# Patient Record
Sex: MALE | Race: WHITE | NOT HISPANIC OR LATINO | Employment: OTHER | URBAN - METROPOLITAN AREA
[De-identification: names, ages, dates, MRNs, and addresses within clinical notes are randomized per-mention and may not be internally consistent; named-entity substitution may affect disease eponyms.]

---

## 2019-07-22 RX ORDER — CLONAZEPAM 1 MG/1
1 TABLET ORAL 2 TIMES DAILY PRN
Qty: 60 TABLET | Refills: 0 | Status: SHIPPED | OUTPATIENT
Start: 2019-07-22 | End: 2019-08-24 | Stop reason: SDUPTHER

## 2019-08-01 ENCOUNTER — OFFICE VISIT (OUTPATIENT)
Dept: FAMILY MEDICINE CLINIC | Facility: CLINIC | Age: 49
End: 2019-08-01

## 2019-08-01 VITALS
BODY MASS INDEX: 47.74 KG/M2 | DIASTOLIC BLOOD PRESSURE: 70 MMHG | SYSTOLIC BLOOD PRESSURE: 130 MMHG | HEART RATE: 84 BPM | HEIGHT: 67 IN | OXYGEN SATURATION: 98 % | TEMPERATURE: 98.1 F | WEIGHT: 304.2 LBS

## 2019-08-01 DIAGNOSIS — R33.9 INCOMPLETE EMPTYING OF BLADDER: Primary | ICD-10-CM

## 2019-08-01 DIAGNOSIS — N13.9 URINARY (TRACT) OBSTRUCTION: ICD-10-CM

## 2019-08-01 DIAGNOSIS — R31.9 TRAUMATIC HEMATURIA: ICD-10-CM

## 2019-08-01 DIAGNOSIS — F32.A DEPRESSION, UNSPECIFIED DEPRESSION TYPE: ICD-10-CM

## 2019-08-01 DIAGNOSIS — F41.9 ANXIETY: ICD-10-CM

## 2019-08-01 DIAGNOSIS — E78.5 HYPERLIPIDEMIA, UNSPECIFIED HYPERLIPIDEMIA TYPE: ICD-10-CM

## 2019-08-01 PROBLEM — K21.9 GERD (GASTROESOPHAGEAL REFLUX DISEASE): Status: ACTIVE | Noted: 2019-08-01

## 2019-08-01 PROBLEM — M15.9 GENERALIZED OSTEOARTHRITIS OF MULTIPLE SITES: Status: ACTIVE | Noted: 2019-08-01

## 2019-08-01 PROBLEM — E66.01 OBESITY, MORBID, BMI 40.0-49.9 (HCC): Status: ACTIVE | Noted: 2019-08-01

## 2019-08-01 PROBLEM — J45.909 ASTHMA: Status: ACTIVE | Noted: 2019-08-01

## 2019-08-01 PROBLEM — G47.8 SLEEP PARALYSIS: Status: ACTIVE | Noted: 2019-08-01

## 2019-08-01 PROBLEM — M85.80 OSTEOPENIA: Status: ACTIVE | Noted: 2019-08-01

## 2019-08-01 PROBLEM — E29.1 TESTOSTERONE DEFICIENCY IN MALE: Status: ACTIVE | Noted: 2019-08-01

## 2019-08-01 PROBLEM — Z79.52 CURRENT CHRONIC USE OF SYSTEMIC STEROIDS: Status: ACTIVE | Noted: 2019-08-01

## 2019-08-01 PROBLEM — R00.2 PALPITATIONS: Status: ACTIVE | Noted: 2019-08-01

## 2019-08-01 PROBLEM — F41.0 PANIC ATTACKS: Status: ACTIVE | Noted: 2019-08-01

## 2019-08-01 PROBLEM — Z94.0 H/O KIDNEY TRANSPLANT: Status: ACTIVE | Noted: 2019-08-01

## 2019-08-01 PROBLEM — H81.10 BPV (BENIGN POSITIONAL VERTIGO): Status: ACTIVE | Noted: 2019-08-01

## 2019-08-01 PROBLEM — M72.2 PLANTAR FASCIITIS: Status: ACTIVE | Noted: 2019-08-01

## 2019-08-01 PROCEDURE — 99214 OFFICE O/P EST MOD 30 MIN: CPT | Performed by: INTERNAL MEDICINE

## 2019-08-01 RX ORDER — OXYMORPHONE HYDROCHLORIDE 10 MG/1
TABLET ORAL
Refills: 0 | COMMUNITY
Start: 2019-07-20 | End: 2021-02-25

## 2019-08-01 RX ORDER — GABAPENTIN 100 MG/1
100 CAPSULE ORAL 3 TIMES DAILY
Refills: 2 | COMMUNITY
Start: 2019-07-20

## 2019-08-01 RX ORDER — CIPROFLOXACIN 500 MG/1
TABLET, FILM COATED ORAL
COMMUNITY
Start: 2019-06-11 | End: 2019-12-12

## 2019-08-01 RX ORDER — PREDNISONE 10 MG/1
TABLET ORAL
COMMUNITY
Start: 2019-07-13

## 2019-08-01 RX ORDER — TESTOSTERONE CYPIONATE 200 MG/ML
INJECTION, SOLUTION INTRAMUSCULAR
COMMUNITY
Start: 2019-07-13 | End: 2021-05-20

## 2019-08-01 RX ORDER — FUROSEMIDE 20 MG/1
TABLET ORAL
COMMUNITY
Start: 2019-07-13

## 2019-08-01 RX ORDER — AZATHIOPRINE 50 MG/1
150 TABLET ORAL
COMMUNITY
Start: 2019-07-13

## 2019-08-01 RX ORDER — AMOXICILLIN AND CLAVULANATE POTASSIUM 875; 125 MG/1; MG/1
TABLET, FILM COATED ORAL
COMMUNITY
Start: 2019-06-11 | End: 2019-12-12

## 2019-08-01 RX ORDER — NEEDLES, DISPOSABLE 25GX5/8"
NEEDLE, DISPOSABLE MISCELLANEOUS
COMMUNITY
Start: 2019-06-17 | End: 2022-11-18

## 2019-08-01 RX ORDER — SIMVASTATIN 20 MG
20 TABLET ORAL NIGHTLY
Qty: 30 TABLET | Refills: 3
Start: 2019-08-01 | End: 2019-10-14 | Stop reason: SDUPTHER

## 2019-08-01 RX ORDER — SYRINGE WITH NEEDLE, 1 ML 25GX5/8"
SYRINGE, EMPTY DISPOSABLE MISCELLANEOUS
COMMUNITY
Start: 2019-07-13 | End: 2022-11-18

## 2019-08-01 NOTE — PROGRESS NOTES
Subjective   Misael Luna is a 49 y.o. male.     Chief Complaint   Patient presents with   • Hyperlipidemia     disabiltiy paper work       History of Present Illness   Here for disability forms and follow up for the urinary obstruction and incomplete voiding with history of renal transplant and depression.  Has continued recurrent urinary tract infections and Dr Bruno is treating the most recent with cipro and near complete with the course.  Still doing the self catheterization requiring laying in a prone position.  Has pain with catheterization that persists for 45 min after.  Still with blood in the urine and abdomen pain with the urinary retention.  Follow-up for cholesterol.  Currently has been feeling well without any myalgias, muscle aches, weakness, or other issues.  Currently is adherent with his medication regimen and denies medication side effects. Is not due for lab follow-up.    Has been seeing [facility] for counseling but has been rescheduled by the clinic multiple times.    The following portions of the patient's history were reviewed and updated as appropriate: allergies, current medications, past family history, past medical history, past social history, past surgical history and problem list.    History reviewed. No pertinent past medical history.    Past Surgical History:   Procedure Laterality Date   • BLADDER SURGERY      bladder augmentation   • NEPHRECTOMY     • TRANSPLANTATION RENAL         Family History   Problem Relation Age of Onset   • Arthritis Mother    • Asthma Mother    • Arthritis Father    • Asthma Father    • Heart disease Father    • COPD Father    • Hyperlipidemia Father    • Arthritis Maternal Grandmother    • Osteoporosis Maternal Grandfather    • Arthritis Maternal Grandfather        Social History     Socioeconomic History   • Marital status:      Spouse name: Not on file   • Number of children: Not on file   • Years of education: Not on file   • Highest  education level: Not on file   Tobacco Use   • Smoking status: Never Smoker   • Smokeless tobacco: Never Used   Substance and Sexual Activity   • Alcohol use: No     Frequency: Never   • Drug use: No       Review of Systems   Constitutional: Negative for unexpected weight gain and unexpected weight loss.   HENT: Negative for trouble swallowing and voice change.    Eyes: Negative for visual disturbance.   Respiratory: Negative for apnea, cough, chest tightness and shortness of breath.    Cardiovascular: Positive for leg swelling. Negative for chest pain and palpitations.   Gastrointestinal: Positive for abdominal pain. Negative for blood in stool, diarrhea, nausea, vomiting, GERD and indigestion.   Endocrine: Positive for polydipsia and polyuria.   Musculoskeletal: Negative for arthralgias and joint swelling.   Neurological: Negative for tremors, syncope, weakness, light-headedness, memory problem and confusion.   Hematological: Negative for adenopathy. Does not bruise/bleed easily.   Psychiatric/Behavioral: Negative for depressed mood and stress. The patient is not nervous/anxious.        Objective   Vitals:    08/01/19 0912   BP: 130/70   Pulse: 84   Temp: 98.1 °F (36.7 °C)   SpO2: 98%     Body mass index is 47.64 kg/m².  Physical Exam   Constitutional: He is oriented to person, place, and time. He appears well-developed and well-nourished.   HENT:   Head: Normocephalic and atraumatic.   Eyes: Conjunctivae and EOM are normal. Pupils are equal, round, and reactive to light.   Neck: Normal range of motion. Neck supple.   Cardiovascular: Normal rate, regular rhythm, normal heart sounds and intact distal pulses.   Pulmonary/Chest: Effort normal and breath sounds normal. He has no rales.   Abdominal: Soft. Bowel sounds are normal. There is tenderness.   Suprapubic tenderness in obese abdomen.   Musculoskeletal: Normal range of motion. He exhibits edema. He exhibits no deformity.   Neurological: He is alert and oriented  to person, place, and time.   Skin: Skin is warm. Capillary refill takes less than 2 seconds. No rash noted. No erythema.   Psychiatric: He has a normal mood and affect. His behavior is normal. Judgment and thought content normal.       No results found for this or any previous visit (from the past 2016 hour(s)).  Assessment/Plan   Misael was seen today for hyperlipidemia.    Diagnoses and all orders for this visit:    Incomplete emptying of bladder    Traumatic hematuria    Urinary (tract) obstruction    Hyperlipidemia, unspecified hyperlipidemia type    Depression, unspecified depression type    Anxiety    Other orders  -     simvastatin (ZOCOR) 20 MG tablet; Take 1 tablet by mouth Every Night.      See disability form completed.  Continue the currnet medications and controlled substance agreement reviewd and signed in office.  Follow up with urology and transplant team.

## 2019-08-27 RX ORDER — CLONAZEPAM 1 MG/1
TABLET ORAL
Qty: 60 TABLET | Refills: 0 | Status: SHIPPED | OUTPATIENT
Start: 2019-08-27 | End: 2019-10-09 | Stop reason: SDUPTHER

## 2019-10-09 RX ORDER — CLONAZEPAM 1 MG/1
1 TABLET ORAL 2 TIMES DAILY PRN
Qty: 60 TABLET | Refills: 1 | Status: SHIPPED | OUTPATIENT
Start: 2019-10-09 | End: 2019-12-12 | Stop reason: SDUPTHER

## 2019-10-14 ENCOUNTER — OFFICE VISIT (OUTPATIENT)
Dept: FAMILY MEDICINE CLINIC | Facility: CLINIC | Age: 49
End: 2019-10-14

## 2019-10-14 VITALS
HEIGHT: 67 IN | WEIGHT: 311.4 LBS | DIASTOLIC BLOOD PRESSURE: 84 MMHG | SYSTOLIC BLOOD PRESSURE: 130 MMHG | TEMPERATURE: 98.2 F | OXYGEN SATURATION: 97 % | HEART RATE: 66 BPM | BODY MASS INDEX: 48.88 KG/M2

## 2019-10-14 DIAGNOSIS — E78.5 HYPERLIPIDEMIA, UNSPECIFIED HYPERLIPIDEMIA TYPE: ICD-10-CM

## 2019-10-14 DIAGNOSIS — J45.40 MODERATE PERSISTENT ASTHMA, UNSPECIFIED WHETHER COMPLICATED: Primary | ICD-10-CM

## 2019-10-14 PROCEDURE — 99214 OFFICE O/P EST MOD 30 MIN: CPT | Performed by: INTERNAL MEDICINE

## 2019-10-14 PROCEDURE — 90686 IIV4 VACC NO PRSV 0.5 ML IM: CPT | Performed by: INTERNAL MEDICINE

## 2019-10-14 PROCEDURE — 90471 IMMUNIZATION ADMIN: CPT | Performed by: INTERNAL MEDICINE

## 2019-10-14 RX ORDER — ALBUTEROL SULFATE 90 UG/1
2 AEROSOL, METERED RESPIRATORY (INHALATION) EVERY 4 HOURS PRN
Qty: 1 INHALER | Refills: 5 | Status: SHIPPED | OUTPATIENT
Start: 2019-10-14 | End: 2021-02-25 | Stop reason: SDUPTHER

## 2019-10-14 RX ORDER — SIMVASTATIN 20 MG
20 TABLET ORAL NIGHTLY
Qty: 90 TABLET | Refills: 3 | Status: SHIPPED | OUTPATIENT
Start: 2019-10-14 | End: 2020-06-11 | Stop reason: SDUPTHER

## 2019-10-14 NOTE — PROGRESS NOTES
Subjective   Misael Luna is a 49 y.o. male.     Chief Complaint   Patient presents with   • Hyperlipidemia     labs   • Asthma       History of Present Illness   Here for follow up for the asthma.  States has been having an asthma issue nearly every day with cough and wheezing.  Using the nebulizer and albuterol inhaler and improves.  Using the proair but liked the ventolin better.  Using the albuterol mini nebulizer sometimes.  Follow-up for cholesterol.  Currently has been feeling well without any myalgias, muscle aches, weakness, numbness, chest pain, short of breath or other issues.  Currently is not adherent with medication regimen and denies medication side effects.  Has not had the simvastatin for 3 months.  Is due for lab follow-up.    The following portions of the patient's history were reviewed and updated as appropriate: allergies, current medications, past family history, past medical history, past social history, past surgical history and problem list.    Past Medical History:   Diagnosis Date   • Asthma    • Disorder of skin    • Generalized osteoarthritis    • Hematuria    • High risk sexual behavior    • History of closed fracture     Lower end of forearm, unspecified laterality    • History of kidney transplant     Kidney replaced by transplant   • Hypertension    • Incomplete emptying of bladder    • Obesity    • Penile lesion    • Tinea cruris    • Urinary obstruction    • Urinary tract infection        Past Surgical History:   Procedure Laterality Date   • BLADDER SURGERY      bladder augmentation   • NEPHRECTOMY     • TRANSPLANTATION RENAL  1991       Family History   Problem Relation Age of Onset   • Arthritis Mother    • Asthma Mother    • Arthritis Father    • Asthma Father    • Heart disease Father    • COPD Father    • Hyperlipidemia Father    • Arthritis Maternal Grandmother    • Osteoporosis Maternal Grandfather    • Arthritis Maternal Grandfather    • No Known Problems Other         Social History     Socioeconomic History   • Marital status:      Spouse name: Not on file   • Number of children: Not on file   • Years of education: Not on file   • Highest education level: Not on file   Tobacco Use   • Smoking status: Never Smoker   • Smokeless tobacco: Never Used   Substance and Sexual Activity   • Alcohol use: No     Frequency: Never   • Drug use: No       Review of Systems   Constitutional: Negative for activity change, appetite change, fatigue, fever, unexpected weight gain and unexpected weight loss.   HENT: Negative for nosebleeds, rhinorrhea, trouble swallowing and voice change.    Eyes: Negative for visual disturbance.   Respiratory: Negative for cough, chest tightness, shortness of breath and wheezing.    Cardiovascular: Positive for leg swelling. Negative for chest pain and palpitations.   Gastrointestinal: Positive for abdominal pain. Negative for blood in stool, constipation, diarrhea, nausea, vomiting, GERD and indigestion.   Endocrine: Positive for polydipsia and polyuria.   Genitourinary: Negative for dysuria, frequency and hematuria.   Musculoskeletal: Negative for arthralgias, back pain and myalgias.   Skin: Negative for rash and bruise.   Neurological: Negative for dizziness, tremors, weakness, light-headedness, numbness, headache and memory problem.   Hematological: Negative for adenopathy. Does not bruise/bleed easily.   Psychiatric/Behavioral: Negative for sleep disturbance and depressed mood. The patient is not nervous/anxious.        Objective   Vitals:    10/14/19 0945   BP: 130/84   Pulse: 66   Temp: 98.2 °F (36.8 °C)   SpO2: 97%     Body mass index is 48.77 kg/m².  Physical Exam   Constitutional: He is oriented to person, place, and time. He appears well-developed and well-nourished. No distress.   HENT:   Head: Normocephalic and atraumatic.   Right Ear: External ear normal.   Left Ear: External ear normal.   Nose: Nose normal.   Mouth/Throat: Oropharynx is  clear and moist.   Eyes: Conjunctivae and EOM are normal. Pupils are equal, round, and reactive to light.   Neck: Normal range of motion. Neck supple. No tracheal deviation present. No thyromegaly present.   Cardiovascular: Normal rate, regular rhythm, normal heart sounds and intact distal pulses. Exam reveals no gallop and no friction rub.   No murmur heard.  Pulmonary/Chest: Effort normal and breath sounds normal. No respiratory distress.   Abdominal: Soft. Bowel sounds are normal. He exhibits no mass. There is tenderness. There is no guarding.   Suprapubic tenderness in obese abdomen.   Musculoskeletal: Normal range of motion. He exhibits edema.   Trace bilateral lower extremity edema   Lymphadenopathy:     He has no cervical adenopathy.   Neurological: He is alert and oriented to person, place, and time. He displays normal reflexes. He exhibits normal muscle tone.   Skin: Skin is warm and dry. Capillary refill takes less than 2 seconds. No rash noted. He is not diaphoretic.   Psychiatric: He has a normal mood and affect. His behavior is normal. Judgment and thought content normal.   Nursing note and vitals reviewed.      No results found for this or any previous visit (from the past 2016 hour(s)).  Assessment/Plan   Misael was seen today for hyperlipidemia and asthma.    Diagnoses and all orders for this visit:    Moderate persistent asthma, unspecified whether complicated  -     albuterol sulfate  (90 Base) MCG/ACT inhaler; Inhale 2 puffs Every 4 (Four) Hours As Needed for Wheezing.  -     Fluticasone Furoate 100 MCG/ACT aerosol powder ; Inhale 1 puff Daily. Rinse mouth with water after use.    Hyperlipidemia, unspecified hyperlipidemia type    Other orders  -     Fluarix/Fluzone/Afluria Quad/FluLaval Quad  -     simvastatin (ZOCOR) 20 MG tablet; Take 1 tablet by mouth Every Night.        Restart the simvastatin that he has not been taking for 3 months.  Continue the other medications but will add inhaled  steroids for the uncontrolled asthma.  Continue follow up with transplant specialists for the kidney transplant.  No labs today but to follow up in 3 months and will check then.

## 2019-12-12 ENCOUNTER — OFFICE VISIT (OUTPATIENT)
Dept: FAMILY MEDICINE CLINIC | Facility: CLINIC | Age: 49
End: 2019-12-12

## 2019-12-12 VITALS
SYSTOLIC BLOOD PRESSURE: 140 MMHG | DIASTOLIC BLOOD PRESSURE: 82 MMHG | HEIGHT: 67 IN | HEART RATE: 72 BPM | BODY MASS INDEX: 48.76 KG/M2 | OXYGEN SATURATION: 97 %

## 2019-12-12 DIAGNOSIS — F41.9 ANXIETY: ICD-10-CM

## 2019-12-12 DIAGNOSIS — N30.01 ACUTE CYSTITIS WITH HEMATURIA: Primary | ICD-10-CM

## 2019-12-12 DIAGNOSIS — J45.40 MODERATE PERSISTENT ASTHMA, UNSPECIFIED WHETHER COMPLICATED: ICD-10-CM

## 2019-12-12 DIAGNOSIS — M25.571 ACUTE RIGHT ANKLE PAIN: ICD-10-CM

## 2019-12-12 DIAGNOSIS — E66.01 CLASS 3 SEVERE OBESITY WITHOUT SERIOUS COMORBIDITY WITH BODY MASS INDEX (BMI) OF 45.0 TO 49.9 IN ADULT, UNSPECIFIED OBESITY TYPE (HCC): ICD-10-CM

## 2019-12-12 PROBLEM — N39.0 URINARY TRACT INFECTION: Status: ACTIVE | Noted: 2019-12-12

## 2019-12-12 LAB
BILIRUB BLD-MCNC: NEGATIVE MG/DL
CLARITY, POC: CLEAR
COLOR UR: ABNORMAL
GLUCOSE UR STRIP-MCNC: NEGATIVE MG/DL
KETONES UR QL: NEGATIVE
LEUKOCYTE EST, POC: NEGATIVE
NITRITE UR-MCNC: NEGATIVE MG/ML
PH UR: 6.5 [PH] (ref 5–8)
PROT UR STRIP-MCNC: ABNORMAL MG/DL
RBC # UR STRIP: ABNORMAL /UL
SP GR UR: 1.01 (ref 1–1.03)
UROBILINOGEN UR QL: NORMAL

## 2019-12-12 PROCEDURE — 81003 URINALYSIS AUTO W/O SCOPE: CPT | Performed by: INTERNAL MEDICINE

## 2019-12-12 PROCEDURE — 99214 OFFICE O/P EST MOD 30 MIN: CPT | Performed by: INTERNAL MEDICINE

## 2019-12-12 RX ORDER — AMOXICILLIN AND CLAVULANATE POTASSIUM 875; 125 MG/1; MG/1
1 TABLET, FILM COATED ORAL 2 TIMES DAILY
Qty: 20 TABLET | Refills: 0 | Status: SHIPPED | OUTPATIENT
Start: 2019-12-12 | End: 2021-02-25

## 2019-12-12 RX ORDER — ALBUTEROL SULFATE 1.25 MG/3ML
1 SOLUTION RESPIRATORY (INHALATION) EVERY 6 HOURS PRN
Qty: 120 VIAL | Refills: 6 | Status: SHIPPED | OUTPATIENT
Start: 2019-12-12 | End: 2020-12-07 | Stop reason: SDUPTHER

## 2019-12-12 RX ORDER — NEEDLES, DISPOSABLE 18GX1 1/2"
NEEDLE, DISPOSABLE MISCELLANEOUS
Refills: 3 | COMMUNITY
Start: 2019-11-04 | End: 2022-11-18

## 2019-12-12 RX ORDER — CLONAZEPAM 1 MG/1
1 TABLET ORAL 2 TIMES DAILY PRN
Qty: 60 TABLET | Refills: 1 | Status: SHIPPED | OUTPATIENT
Start: 2019-12-12 | End: 2020-02-20 | Stop reason: SDUPTHER

## 2019-12-12 NOTE — PATIENT INSTRUCTIONS
Exercising to Lose Weight  Exercise is structured, repetitive physical activity to improve fitness and health. Getting regular exercise is important for everyone. It is especially important if you are overweight. Being overweight increases your risk of heart disease, stroke, diabetes, high blood pressure, and several types of cancer. Reducing your calorie intake and exercising can help you lose weight.  Exercise is usually categorized as moderate or vigorous intensity. To lose weight, most people need to do a certain amount of moderate-intensity or vigorous-intensity exercise each week.  Moderate-intensity exercise    Moderate-intensity exercise is any activity that gets you moving enough to burn at least three times more energy (calories) than if you were sitting.  Examples of moderate exercise include:  · Walking a mile in 15 minutes.  · Doing light yard work.  · Biking at an easy pace.  Most people should get at least 150 minutes (2 hours and 30 minutes) a week of moderate-intensity exercise to maintain their body weight.  Vigorous-intensity exercise  Vigorous-intensity exercise is any activity that gets you moving enough to burn at least six times more calories than if you were sitting. When you exercise at this intensity, you should be working hard enough that you are not able to carry on a conversation.  Examples of vigorous exercise include:  · Running.  · Playing a team sport, such as football, basketball, and soccer.  · Jumping rope.  Most people should get at least 75 minutes (1 hour and 15 minutes) a week of vigorous-intensity exercise to maintain their body weight.  How can exercise affect me?  When you exercise enough to burn more calories than you eat, you lose weight. Exercise also reduces body fat and builds muscle. The more muscle you have, the more calories you burn. Exercise also:  · Improves mood.  · Reduces stress and tension.  · Improves your overall fitness, flexibility, and  endurance.  · Increases bone strength.  The amount of exercise you need to lose weight depends on:  · Your age.  · The type of exercise.  · Any health conditions you have.  · Your overall physical ability.  Talk to your health care provider about how much exercise you need and what types of activities are safe for you.  What actions can I take to lose weight?  Nutrition    · Make changes to your diet as told by your health care provider or diet and nutrition specialist (dietitian). This may include:  ? Eating fewer calories.  ? Eating more protein.  ? Eating less unhealthy fats.  ? Eating a diet that includes fresh fruits and vegetables, whole grains, low-fat dairy products, and lean protein.  ? Avoiding foods with added fat, salt, and sugar.  · Drink plenty of water while you exercise to prevent dehydration or heat stroke.  Activity  · Choose an activity that you enjoy and set realistic goals. Your health care provider can help you make an exercise plan that works for you.  · Exercise at a moderate or vigorous intensity most days of the week.  ? The intensity of exercise may vary from person to person. You can tell how intense a workout is for you by paying attention to your breathing and heartbeat. Most people will notice their breathing and heartbeat get faster with more intense exercise.  · Do resistance training twice each week, such as:  ? Push-ups.  ? Sit-ups.  ? Lifting weights.  ? Using resistance bands.  · Getting short amounts of exercise can be just as helpful as long structured periods of exercise. If you have trouble finding time to exercise, try to include exercise in your daily routine.  ? Get up, stretch, and walk around every 30 minutes throughout the day.  ? Go for a walk during your lunch break.  ? Park your car farther away from your destination.  ? If you take public transportation, get off one stop early and walk the rest of the way.  ? Make phone calls while standing up and walking  around.  ? Take the stairs instead of elevators or escalators.  · Wear comfortable clothes and shoes with good support.  · Do not exercise so much that you hurt yourself, feel dizzy, or get very short of breath.  Where to find more information  · [place] Department of Health and Human Services: www.hhs.gov  · Centers for Disease Control and Prevention (CDC): www.cdc.gov  Contact a health care provider:  · Before starting a new exercise program.  · If you have questions or concerns about your weight.  · If you have a medical problem that keeps you from exercising.  Get help right away if you have any of the following while exercising:  · Injury.  · Dizziness.  · Difficulty breathing or shortness of breath that does not go away when you stop exercising.  · Chest pain.  · Rapid heartbeat.  Summary  · Being overweight increases your risk of heart disease, stroke, diabetes, high blood pressure, and several types of cancer.  · Losing weight happens when you burn more calories than you eat.  · Reducing the amount of calories you eat in addition to getting regular moderate or vigorous exercise each week helps you lose weight.  This information is not intended to replace advice given to you by your health care provider. Make sure you discuss any questions you have with your health care provider.  Document Released: 01/20/2012 Document Revised: 12/31/2018 Document Reviewed: 12/31/2018  [employer] Patient Education © 2019 [employer]      Calorie Counting for Weight Loss  Calories are units of energy. Your body needs a certain amount of calories from food to keep you going throughout the day. When you eat more calories than your body needs, your body stores the extra calories as fat. When you eat fewer calories than your body needs, your body burns fat to get the energy it needs.  Calorie counting means keeping track of how many calories you eat and drink each day. Calorie counting can be helpful if you need to lose  weight. If you make sure to eat fewer calories than your body needs, you should lose weight. Ask your health care provider what a healthy weight is for you.  For calorie counting to work, you will need to eat the right number of calories in a day in order to lose a healthy amount of weight per week. A dietitian can help you determine how many calories you need in a day and will give you suggestions on how to reach your calorie goal.  · A healthy amount of weight to lose per week is usually 1-2 lb (0.5-0.9 kg). This usually means that your daily calorie intake should be reduced by 500-750 calories.  · Eating 1,200 - 1,500 calories per day can help most women lose weight.  · Eating 1,500 - 1,800 calories per day can help most men lose weight.  What is my plan?  My goal is to have __________ calories per day.  If I have this many calories per day, I should lose around __________ pounds per week.  What do I need to know about calorie counting?  In order to meet your daily calorie goal, you will need to:  · Find out how many calories are in each food you would like to eat. Try to do this before you eat.  · Decide how much of the food you plan to eat.  · Write down what you ate and how many calories it had. Doing this is called keeping a food log.  To successfully lose weight, it is important to balance calorie counting with a healthy lifestyle that includes regular activity. Aim for 150 minutes of moderate exercise (such as walking) or 75 minutes of vigorous exercise (such as running) each week.  Where do I find calorie information?    The number of calories in a food can be found on a Nutrition Facts label. If a food does not have a Nutrition Facts label, try to look up the calories online or ask your dietitian for help.  Remember that calories are listed per serving. If you choose to have more than one serving of a food, you will have to multiply the calories per serving by the amount of servings you plan to eat. For  "example, the label on a package of bread might say that a serving size is 1 slice and that there are 90 calories in a serving. If you eat 1 slice, you will have eaten 90 calories. If you eat 2 slices, you will have eaten 180 calories.  How do I keep a food log?  Immediately after each meal, record the following information in your food log:  · What you ate. Don't forget to include toppings, sauces, and other extras on the food.  · How much you ate. This can be measured in cups, ounces, or number of items.  · How many calories each food and drink had.  · The total number of calories in the meal.  Keep your food log near you, such as in a small notebook in your pocket, or use a mobile dariel or website. Some programs will calculate calories for you and show you how many calories you have left for the day to meet your goal.  What are some calorie counting tips?    · Use your calories on foods and drinks that will fill you up and not leave you hungry:  ? Some examples of foods that fill you up are nuts and nut butters, vegetables, lean proteins, and high-fiber foods like whole grains. High-fiber foods are foods with more than 5 g fiber per serving.  ? Drinks such as sodas, specialty coffee drinks, alcohol, and juices have a lot of calories, yet do not fill you up.  · Eat nutritious foods and avoid empty calories. Empty calories are calories you get from foods or beverages that do not have many vitamins or protein, such as candy, sweets, and soda. It is better to have a nutritious high-calorie food (such as an avocado) than a food with few nutrients (such as a bag of chips).  · Know how many calories are in the foods you eat most often. This will help you calculate calorie counts faster.  · Pay attention to calories in drinks. Low-calorie drinks include water and unsweetened drinks.  · Pay attention to nutrition labels for \"low fat\" or \"fat free\" foods. These foods sometimes have the same amount of calories or more calories " than the full fat versions. They also often have added sugar, starch, or salt, to make up for flavor that was removed with the fat.  · Find a way of tracking calories that works for you. Get creative. Try different apps or programs if writing down calories does not work for you.  What are some portion control tips?  · Know how many calories are in a serving. This will help you know how many servings of a certain food you can have.  · Use a measuring cup to measure serving sizes. You could also try weighing out portions on a kitchen scale. With time, you will be able to estimate serving sizes for some foods.  · Take some time to put servings of different foods on your favorite plates, bowls, and cups so you know what a serving looks like.  · Try not to eat straight from a bag or box. Doing this can lead to overeating. Put the amount you would like to eat in a cup or on a plate to make sure you are eating the right portion.  · Use smaller plates, glasses, and bowls to prevent overeating.  · Try not to multitask (for example, watch TV or use your computer) while eating. If it is time to eat, sit down at a table and enjoy your food. This will help you to know when you are full. It will also help you to be aware of what you are eating and how much you are eating.  What are tips for following this plan?  Reading food labels  · Check the calorie count compared to the serving size. The serving size may be smaller than what you are used to eating.  · Check the source of the calories. Make sure the food you are eating is high in vitamins and protein and low in saturated and trans fats.  Shopping  · Read nutrition labels while you shop. This will help you make healthy decisions before you decide to purchase your food.  · Make a grocery list and stick to it.  Cooking  · Try to cook your favorite foods in a healthier way. For example, try baking instead of frying.  · Use low-fat dairy products.  Meal planning  · Use more fruits  "and vegetables. Half of your plate should be fruits and vegetables.  · Include lean proteins like poultry and fish.  How do I count calories when eating out?  · Ask for smaller portion sizes.  · Consider sharing an entree and sides instead of getting your own entree.  · If you get your own entree, eat only half. Ask for a box at the beginning of your meal and put the rest of your entree in it so you are not tempted to eat it.  · If calories are listed on the menu, choose the lower calorie options.  · Choose dishes that include vegetables, fruits, whole grains, low-fat dairy products, and lean protein.  · Choose items that are boiled, broiled, grilled, or steamed. Stay away from items that are buttered, battered, fried, or served with cream sauce. Items labeled \"crispy\" are usually fried, unless stated otherwise.  · Choose water, low-fat milk, unsweetened iced tea, or other drinks without added sugar. If you want an alcoholic beverage, choose a lower calorie option such as a glass of wine or light beer.  · Ask for dressings, sauces, and syrups on the side. These are usually high in calories, so you should limit the amount you eat.  · If you want a salad, choose a garden salad and ask for grilled meats. Avoid extra toppings like lainez, cheese, or fried items. Ask for the dressing on the side, or ask for olive oil and vinegar or lemon to use as dressing.  · Estimate how many servings of a food you are given. For example, a serving of cooked rice is ½ cup or about the size of half a baseball. Knowing serving sizes will help you be aware of how much food you are eating at restaurants. The list below tells you how big or small some common portion sizes are based on everyday objects:  ? 1 oz--4 stacked dice.  ? 3 oz--1 deck of cards.  ? 1 tsp--1 die.  ? 1 Tbsp--½ a ping-pong ball.  ? 2 Tbsp--1 ping-pong ball.  ? ½ cup--½ baseball.  ? 1 cup--1 baseball.  Summary  · Calorie counting means keeping track of how many calories " you eat and drink each day. If you eat fewer calories than your body needs, you should lose weight.  · A healthy amount of weight to lose per week is usually 1-2 lb (0.5-0.9 kg). This usually means reducing your daily calorie intake by 500-750 calories.  · The number of calories in a food can be found on a Nutrition Facts label. If a food does not have a Nutrition Facts label, try to look up the calories online or ask your dietitian for help.  · Use your calories on foods and drinks that will fill you up, and not on foods and drinks that will leave you hungry.  · Use smaller plates, glasses, and bowls to prevent overeating.  This information is not intended to replace advice given to you by your health care provider. Make sure you discuss any questions you have with your health care provider.  Document Released: 12/18/2006 Document Revised: 09/06/2019 Document Reviewed: 11/17/2017  [employer] Patient Education © 2019 [employer]

## 2019-12-12 NOTE — PROGRESS NOTES
Subjective   Misael Luna is a 49 y.o. male.     Chief Complaint   Patient presents with   • Urinary Tract Infection   • Edema     R. ankle       History of Present Illness   Complains of 3-4 days of back pain, fever, chills, nausea and mild abdomen pain with increased blood in the urine with the self catheterization.  Does not see Dr Bruno urology for over a month.  Last took cipro 15 days ago for the recurrent UTIs as is high risk with the self catheterization and urinary retention.  For the last 3-4 days with swelling and pain in the right ankle that is a recurrent pain ever since injury in 1996.  In past has seen Dr Murillo in the past but currently is not doing anything for it.  No recent injury or fall.    The following portions of the patient's history were reviewed and updated as appropriate: allergies, current medications, past family history, past medical history, past social history, past surgical history and problem list.    Past Medical History:   Diagnosis Date   • Asthma    • Disorder of skin    • Generalized osteoarthritis    • Hematuria    • High risk sexual behavior    • History of closed fracture     Lower end of forearm, unspecified laterality    • History of kidney transplant     Kidney replaced by transplant   • Hypertension    • Incomplete emptying of bladder    • Obesity    • Penile lesion    • Tinea cruris    • Urinary obstruction    • Urinary tract infection        Past Surgical History:   Procedure Laterality Date   • BLADDER SURGERY      bladder augmentation   • NEPHRECTOMY     • TRANSPLANTATION RENAL  1991       Family History   Problem Relation Age of Onset   • Arthritis Mother    • Asthma Mother    • Arthritis Father    • Asthma Father    • Heart disease Father    • COPD Father    • Hyperlipidemia Father    • Arthritis Maternal Grandmother    • Osteoporosis Maternal Grandfather    • Arthritis Maternal Grandfather    • No Known Problems Other        Social History     Socioeconomic  "History   • Marital status:      Spouse name: Not on file   • Number of children: Not on file   • Years of education: Not on file   • Highest education level: Not on file   Tobacco Use   • Smoking status: Never Smoker   • Smokeless tobacco: Never Used   Substance and Sexual Activity   • Alcohol use: No     Frequency: Never   • Drug use: No       Current Outpatient Medications   Medication Sig Dispense Refill   • albuterol sulfate  (90 Base) MCG/ACT inhaler Inhale 2 puffs Every 4 (Four) Hours As Needed for Wheezing. 1 inhaler 5   • azaTHIOprine (IMURAN) 50 MG tablet 150 mg.     • B-D 3CC LUER-DOMINGUEZ SYR 22GX1\" 22G X 1\" 3 ML misc      • BD HYPODERMIC NEEDLE 18G X 1\" misc      • clonazePAM (KlonoPIN) 1 MG tablet Take 1 tablet by mouth 2 (Two) Times a Day As Needed for Anxiety. 60 tablet 1   • Fluticasone Furoate 100 MCG/ACT aerosol powder  Inhale 1 puff Daily. Rinse mouth with water after use. 30 each 5   • furosemide (LASIX) 20 MG tablet      • gabapentin (NEURONTIN) 100 MG capsule Take 100 mg by mouth 3 (Three) Times a Day.  2   • oxymorphone (OPANA) 10 MG tablet TAKE one-half TO ONE TABLET BY MOUTH FOUR TIMES DAILY AS NEEDED FOR PAIN & NEXT FILL DATE 8/20/2019  0   • predniSONE (DELTASONE) 10 MG tablet      • simvastatin (ZOCOR) 20 MG tablet Take 1 tablet by mouth Every Night. 90 tablet 3   • Testosterone Cypionate (DEPOTESTOTERONE CYPIONATE) 200 MG/ML injection      • albuterol (ACCUNEB) 1.25 MG/3ML nebulizer solution Take 3 mL by nebulization Every 6 (Six) Hours As Needed for Wheezing. 120 vial 6   • amoxicillin-clavulanate (AUGMENTIN) 875-125 MG per tablet Take 1 tablet by mouth 2 (Two) Times a Day. 20 tablet 0   • B-D HYPODERMIC NEEDLE 18GX1.5\" 18G X 1-1/2\" misc USE Q  2 WEEKS  3   • diclofenac (VOLTAREN) 1 % gel gel Apply 4 g topically to the appropriate area as directed 4 (Four) Times a Day As Needed (ankle/joint pain). 100 g 1     No current facility-administered medications for this visit.  "       Review of Systems   Constitutional: Positive for chills, fatigue and fever. Negative for activity change, appetite change, unexpected weight gain and unexpected weight loss.   HENT: Negative for nosebleeds, rhinorrhea, trouble swallowing and voice change.    Eyes: Negative for visual disturbance.   Respiratory: Negative for cough, chest tightness, shortness of breath and wheezing.    Cardiovascular: Negative for chest pain, palpitations and leg swelling.   Gastrointestinal: Positive for abdominal pain. Negative for blood in stool, constipation, diarrhea, nausea, vomiting, GERD and indigestion.   Genitourinary: Positive for hematuria. Negative for dysuria and frequency.   Musculoskeletal: Negative for arthralgias, back pain and myalgias.        Right ankle pain.   Skin: Negative for rash and bruise.   Neurological: Negative for dizziness, tremors, weakness, light-headedness, numbness, headache and memory problem.   Hematological: Negative for adenopathy. Does not bruise/bleed easily.   Psychiatric/Behavioral: Negative for sleep disturbance and depressed mood. The patient is not nervous/anxious.        Objective   Vitals:    12/12/19 1429   BP: 140/82   Pulse: 72   SpO2: 97%     Body mass index is 48.76 kg/m².  Physical Exam   Constitutional: He is oriented to person, place, and time. He appears well-developed and well-nourished. No distress.   HENT:   Head: Normocephalic and atraumatic.   Right Ear: External ear normal.   Left Ear: External ear normal.   Nose: Nose normal.   Mouth/Throat: Oropharynx is clear and moist.   Eyes: Pupils are equal, round, and reactive to light. Conjunctivae and EOM are normal.   Neck: Normal range of motion. Neck supple. No tracheal deviation present. No thyromegaly present.   Cardiovascular: Normal rate, regular rhythm, normal heart sounds and intact distal pulses. Exam reveals no gallop and no friction rub.   No murmur heard.  Pulmonary/Chest: Effort normal and breath sounds  normal. No respiratory distress.   Abdominal: Soft. Bowel sounds are normal. He exhibits no mass. There is no tenderness. There is no guarding.   Obese abdomen   Musculoskeletal: Normal range of motion. He exhibits no edema.   Right ankle with swelling and tenderness laterally with FROM.   Lymphadenopathy:     He has no cervical adenopathy.   Neurological: He is alert and oriented to person, place, and time. He displays normal reflexes. He exhibits normal muscle tone.   Skin: Skin is warm and dry. Capillary refill takes less than 2 seconds. No rash noted. He is not diaphoretic.   Psychiatric: He has a normal mood and affect. His behavior is normal. Judgment and thought content normal.   Nursing note and vitals reviewed.      Recent Results (from the past 2016 hour(s))   POCT urinalysis dipstick, automated    Collection Time: 12/12/19  2:50 PM   Result Value Ref Range    Color Dark Yellow Yellow, Straw, Dark Yellow, Mackenzie    Clarity, UA Clear Clear    Specific Gravity  1.015 1.005 - 1.030    pH, Urine 6.5 5.0 - 8.0    Leukocytes Negative Negative    Nitrite, UA Negative Negative    Protein, POC 2+ (A) Negative mg/dL    Glucose, UA Negative Negative, 1000 mg/dL (3+) mg/dL    Ketones, UA Negative Negative    Urobilinogen, UA Normal Normal    Bilirubin Negative Negative    Blood, UA 2+ (A) Negative     Assessment/Plan   Misael was seen today for urinary tract infection and edema.    Diagnoses and all orders for this visit:    Acute cystitis with hematuria  -     Urine Culture - Urine, Urine, Clean Catch  -     POCT urinalysis dipstick, automated    Moderate persistent asthma, unspecified whether complicated  -     Fluticasone Furoate 100 MCG/ACT aerosol powder ; Inhale 1 puff Daily. Rinse mouth with water after use.    Anxiety  -     clonazePAM (KlonoPIN) 1 MG tablet; Take 1 tablet by mouth 2 (Two) Times a Day As Needed for Anxiety.    Acute right ankle pain  -     diclofenac (VOLTAREN) 1 % gel gel; Apply 4 g topically to  the appropriate area as directed 4 (Four) Times a Day As Needed (ankle/joint pain).    Class 3 severe obesity without serious comorbidity with body mass index (BMI) of 45.0 to 49.9 in adult, unspecified obesity type (CMS/[facility])    Other orders  -     amoxicillin-clavulanate (AUGMENTIN) 875-125 MG per tablet; Take 1 tablet by mouth 2 (Two) Times a Day.  -     albuterol (ACCUNEB) 1.25 MG/3ML nebulizer solution; Take 3 mL by nebulization Every 6 (Six) Hours As Needed for Wheezing.      Will initiate treatment for UTI with the Augmentin twice a day.  Await urine culture that is pending now.  Continue his current asthma treatment which appears to be controlling his symptoms greatly.  Discussed and reviewed his anxiety.  Continue the clonazepam as needed.  YU run and reviewed.  Risks of the medication include but are not limited to fatigue, somnolence, increased risk of falls, allergic reaction, dependence, and addiction.  Patient with right ankle pain and evidence of inflammation and swelling but cannot take oral anti-inflammatories secondary to renal transplant history.  Discussed options with patient and will initiate the use of topical diclofenac as has less to little systemic effects to see if we can have some local effect from this.  Patient is understanding continue the other medications as noted.  Encourage diet exercise and weight loss secondary to his elevation in weight and patient is understanding about this.

## 2019-12-14 LAB
BACTERIA UR CULT: NO GROWTH
BACTERIA UR CULT: NORMAL

## 2020-02-20 ENCOUNTER — TELEPHONE (OUTPATIENT)
Dept: FAMILY MEDICINE CLINIC | Facility: CLINIC | Age: 50
End: 2020-02-20

## 2020-02-20 ENCOUNTER — OFFICE VISIT (OUTPATIENT)
Dept: FAMILY MEDICINE CLINIC | Facility: CLINIC | Age: 50
End: 2020-02-20

## 2020-02-20 VITALS
DIASTOLIC BLOOD PRESSURE: 72 MMHG | TEMPERATURE: 98.9 F | BODY MASS INDEX: 48.76 KG/M2 | HEIGHT: 67 IN | SYSTOLIC BLOOD PRESSURE: 120 MMHG | HEART RATE: 74 BPM | OXYGEN SATURATION: 97 %

## 2020-02-20 DIAGNOSIS — M75.102 TEAR OF LEFT SUPRASPINATUS TENDON: ICD-10-CM

## 2020-02-20 DIAGNOSIS — S46.812A PARTIAL TEAR OF LEFT SUBSCAPULARIS TENDON, INITIAL ENCOUNTER: ICD-10-CM

## 2020-02-20 DIAGNOSIS — G47.8 SLEEP PARALYSIS: ICD-10-CM

## 2020-02-20 DIAGNOSIS — R29.898 LEFT ARM WEAKNESS: ICD-10-CM

## 2020-02-20 DIAGNOSIS — M25.512 ACUTE PAIN OF LEFT SHOULDER: ICD-10-CM

## 2020-02-20 DIAGNOSIS — R29.898 WEAKNESS OF LEFT HAND: ICD-10-CM

## 2020-02-20 DIAGNOSIS — M79.602 LEFT ARM PAIN: ICD-10-CM

## 2020-02-20 DIAGNOSIS — R20.0 LEFT ARM NUMBNESS: Primary | ICD-10-CM

## 2020-02-20 DIAGNOSIS — F41.9 ANXIETY: ICD-10-CM

## 2020-02-20 PROBLEM — M79.2 RADICULAR PAIN IN LEFT ARM: Status: ACTIVE | Noted: 2020-02-20

## 2020-02-20 PROCEDURE — 99214 OFFICE O/P EST MOD 30 MIN: CPT | Performed by: INTERNAL MEDICINE

## 2020-02-20 RX ORDER — CIPROFLOXACIN 500 MG/1
500 TABLET, FILM COATED ORAL 2 TIMES DAILY
COMMUNITY
Start: 2020-01-18 | End: 2021-02-25

## 2020-02-20 RX ORDER — OXYCODONE AND ACETAMINOPHEN 7.5; 325 MG/1; MG/1
TABLET ORAL
COMMUNITY
Start: 2020-02-06 | End: 2020-06-11 | Stop reason: ALTCHOICE

## 2020-02-20 RX ORDER — BACLOFEN 10 MG/1
TABLET ORAL
COMMUNITY
Start: 2020-02-06 | End: 2020-06-11

## 2020-02-20 RX ORDER — CLONAZEPAM 1 MG/1
1 TABLET ORAL 2 TIMES DAILY PRN
Qty: 60 TABLET | Refills: 1 | Status: SHIPPED | OUTPATIENT
Start: 2020-02-20 | End: 2020-04-21

## 2020-02-20 NOTE — TELEPHONE ENCOUNTER
Tried calling patient to see if he was going to come back and  letter for jury duty. I will put it in the front office here at [facility]

## 2020-02-20 NOTE — PROGRESS NOTES
Subjective   Misael Luna is a 49 y.o. male.     Chief Complaint   Patient presents with   • MRI results   • Chest Pain       History of Present Illness   Patient saw Dr Murillo for left shoulder pain after fall on 1/7/2020.  Underwent arthrogram left shoulder on 2/13/2020.  Patient initially could not move left shoulder.  Now can move the arm but cannot lift anything or bring cup to the mouth and has weakness in the  and numb in the .  Is having problems working with the orthopedic Dr Murillo.  Pain radiated from back of neck to the top of the left arm when turns the head to the right.  MRI done at [facility] on [facility].    The following portions of the patient's history were reviewed and updated as appropriate: allergies, current medications, past family history, past medical history, past social history, past surgical history and problem list.    Depression Screen:  No flowsheet data found.    Past Medical History:   Diagnosis Date   • Asthma    • Disorder of skin    • Generalized osteoarthritis    • Hematuria    • High risk sexual behavior    • History of closed fracture     Lower end of forearm, unspecified laterality    • History of kidney transplant     Kidney replaced by transplant   • Hypertension    • Incomplete emptying of bladder    • Obesity    • Penile lesion    • Tinea cruris    • Urinary obstruction    • Urinary tract infection        Past Surgical History:   Procedure Laterality Date   • BLADDER SURGERY      bladder augmentation   • NEPHRECTOMY     • TRANSPLANTATION RENAL  1991       Family History   Problem Relation Age of Onset   • Arthritis Mother    • Asthma Mother    • Arthritis Father    • Asthma Father    • Heart disease Father    • COPD Father    • Hyperlipidemia Father    • Arthritis Maternal Grandmother    • Osteoporosis Maternal Grandfather    • Arthritis Maternal Grandfather    • No Known Problems Other        Social History     Socioeconomic History   • Marital  "status:      Spouse name: Not on file   • Number of children: Not on file   • Years of education: Not on file   • Highest education level: Not on file   Tobacco Use   • Smoking status: Never Smoker   • Smokeless tobacco: Never Used   Substance and Sexual Activity   • Alcohol use: No     Frequency: Never   • Drug use: No       Current Outpatient Medications   Medication Sig Dispense Refill   • albuterol (ACCUNEB) 1.25 MG/3ML nebulizer solution Take 3 mL by nebulization Every 6 (Six) Hours As Needed for Wheezing. 120 vial 6   • albuterol sulfate  (90 Base) MCG/ACT inhaler Inhale 2 puffs Every 4 (Four) Hours As Needed for Wheezing. 1 inhaler 5   • amoxicillin-clavulanate (AUGMENTIN) 875-125 MG per tablet Take 1 tablet by mouth 2 (Two) Times a Day. 20 tablet 0   • azaTHIOprine (IMURAN) 50 MG tablet 150 mg.     • B-D 3CC LUER-DOMINGUEZ SYR 22GX1\" 22G X 1\" 3 ML misc      • B-D HYPODERMIC NEEDLE 18GX1.5\" 18G X 1-1/2\" misc USE Q  2 WEEKS  3   • baclofen (LIORESAL) 10 MG tablet TAKE ONE TABLET BY MOUTH TWICE DAILY FOR acute shoulder spasms and pain     • BD HYPODERMIC NEEDLE 18G X 1\" misc      • ciprofloxacin (CIPRO) 500 MG tablet Take 500 mg by mouth 2 (Two) Times a Day.     • clonazePAM (KlonoPIN) 1 MG tablet Take 1 tablet by mouth 2 (Two) Times a Day As Needed for Anxiety. 60 tablet 1   • diclofenac (VOLTAREN) 1 % gel gel Apply 4 g topically to the appropriate area as directed 4 (Four) Times a Day As Needed (ankle/joint pain). 100 g 1   • Fluticasone Furoate 100 MCG/ACT aerosol powder  Inhale 1 puff Daily. Rinse mouth with water after use. 30 each 5   • furosemide (LASIX) 20 MG tablet      • gabapentin (NEURONTIN) 100 MG capsule Take 100 mg by mouth 3 (Three) Times a Day.  2   • oxyCODONE-acetaminophen (PERCOCET) 7.5-325 MG per tablet TAKE ONE TABLET BY MOUTH EVERY 6 HOURS for acute left shoulder pain (7 day supply)     • oxymorphone (OPANA) 10 MG tablet TAKE one-half TO ONE TABLET BY MOUTH FOUR TIMES DAILY AS " "NEEDED FOR PAIN & NEXT FILL DATE 8/20/2019  0   • predniSONE (DELTASONE) 10 MG tablet      • simvastatin (ZOCOR) 20 MG tablet Take 1 tablet by mouth Every Night. 90 tablet 3   • Testosterone Cypionate (DEPOTESTOTERONE CYPIONATE) 200 MG/ML injection        No current facility-administered medications for this visit.        Review of Systems   Constitutional: Negative for activity change, appetite change, fatigue, fever, unexpected weight gain and unexpected weight loss.   HENT: Negative for nosebleeds, rhinorrhea, trouble swallowing and voice change.    Eyes: Negative for visual disturbance.   Respiratory: Negative for cough, chest tightness, shortness of breath and wheezing.    Cardiovascular: Negative for chest pain, palpitations and leg swelling.   Gastrointestinal: Negative for abdominal pain, blood in stool, constipation, diarrhea, nausea, vomiting, GERD and indigestion.   Genitourinary: Negative for dysuria, frequency and hematuria.   Musculoskeletal: Negative for arthralgias, back pain and myalgias.        Right shoulder and arm pain and weakness in the left arm.     Skin: Negative for rash and bruise.   Neurological: Negative for dizziness, tremors, weakness, light-headedness, numbness, headache and memory problem.   Hematological: Negative for adenopathy. Does not bruise/bleed easily.   Psychiatric/Behavioral: Negative for sleep disturbance and depressed mood. The patient is not nervous/anxious.        Objective   /72 (BP Location: Right arm, Patient Position: Sitting, Cuff Size: Large Adult)   Pulse 74   Temp 98.9 °F (37.2 °C) (Temporal)   Ht 170.2 cm (67.01\")   SpO2 97%   BMI 48.76 kg/m²     Physical Exam   Constitutional: He is oriented to person, place, and time. He appears well-developed and well-nourished. No distress.   HENT:   Head: Normocephalic and atraumatic.   Right Ear: External ear normal.   Left Ear: External ear normal.   Nose: Nose normal.   Mouth/Throat: Oropharynx is clear and " moist.   Eyes: Pupils are equal, round, and reactive to light. Conjunctivae and EOM are normal.   Neck: Normal range of motion. Neck supple. No tracheal deviation present. No thyromegaly present.   Cardiovascular: Normal rate, regular rhythm, normal heart sounds and intact distal pulses. Exam reveals no gallop and no friction rub.   No murmur heard.  Pulmonary/Chest: Effort normal and breath sounds normal. No respiratory distress.   Abdominal: Soft. Bowel sounds are normal. He exhibits no mass. There is no tenderness. There is no guarding.   Obese abdomen   Musculoskeletal: Normal range of motion. He exhibits no edema.    strength in the left hand is 3/5 and decreased sensation in the left hand/ fingers and left arm.   Lymphadenopathy:     He has no cervical adenopathy.   Neurological: He is alert and oriented to person, place, and time. He displays normal reflexes. He exhibits normal muscle tone.   Skin: Skin is warm and dry. Capillary refill takes less than 2 seconds. No rash noted. He is not diaphoretic.   Psychiatric: He has a normal mood and affect. His behavior is normal. Judgment and thought content normal.   Nursing note and vitals reviewed.      Recent Results (from the past 2016 hour(s))   Urine Culture - Urine, Urine, Clean Catch    Collection Time: 12/12/19  2:50 PM   Result Value Ref Range    Urine Culture Final report     Result 1 No growth    POCT urinalysis dipstick, automated    Collection Time: 12/12/19  2:50 PM   Result Value Ref Range    Color Dark Yellow Yellow, Straw, Dark Yellow, Mackenzie    Clarity, UA Clear Clear    Specific Gravity  1.015 1.005 - 1.030    pH, Urine 6.5 5.0 - 8.0    Leukocytes Negative Negative    Nitrite, UA Negative Negative    Protein, POC 2+ (A) Negative mg/dL    Glucose, UA Negative Negative, 1000 mg/dL (3+) mg/dL    Ketones, UA Negative Negative    Urobilinogen, UA Normal Normal    Bilirubin Negative Negative    Blood, UA 2+ (A) Negative     Assessment/Plan   Misael was  seen today for mri results and chest pain.    Diagnoses and all orders for this visit:    Left arm numbness  -     MRI Cervical Spine Without Contrast; Future  -     Ambulatory Referral to Orthopedic Surgery    Left arm pain  -     MRI Cervical Spine Without Contrast; Future  -     Ambulatory Referral to Orthopedic Surgery    Acute pain of left shoulder  -     MRI Cervical Spine Without Contrast; Future  -     Ambulatory Referral to Orthopedic Surgery    Left arm weakness  -     MRI Cervical Spine Without Contrast; Future  -     Ambulatory Referral to Orthopedic Surgery    Weakness of left hand  -     MRI Cervical Spine Without Contrast; Future  -     Ambulatory Referral to Orthopedic Surgery    Partial tear of left subscapularis tendon, initial encounter  -     Ambulatory Referral to Orthopedic Surgery    Tear of left supraspinatus tendon  -     Ambulatory Referral to Orthopedic Surgery    Anxiety  -     clonazePAM (KlonoPIN) 1 MG tablet; Take 1 tablet by mouth 2 (Two) Times a Day As Needed for Anxiety.    Sleep paralysis  -     Ambulatory Referral to Sleep Medicine      Reviewed with patient his MRI of the shoulder that demonstrated the partial tears of the subscapularis tendon in the supraspinatus tendon on the left and evidence of arthrosis.  He had not received these with results from the orthopedic doctor yet and was surprised by the findings.  His arthrogram did not show any loose bodies or even a full-thickness tear but that is all.  He is unhappy with his current orthopedic doctor and therefore we will refer to a new orthopedic doctor for evaluation.  I am concerned about his left arm numbness and weakness specifically even in his hand and  strength.  The association of left neck pain especially with turning the head to the right that shoots down the arm concerns me that he may have a radiculopathy something coming from the cervical spine.  I recommended that he have an MRI of the cervical spot for  evaluation and patient is agreeable.  Patient does have some considerable history of sleep paralysis issues which she is upset about and wishes to have further evaluation and therefore will refer to sleep medicine for evaluation.  Patient has anxiety but is likely mostly related to his sleep paralysis and he has been using clonazepam as needed for the anxiety I recommend that he use it as needed only and at minimum amount as possible.

## 2020-04-21 DIAGNOSIS — F41.9 ANXIETY: ICD-10-CM

## 2020-04-21 RX ORDER — CLONAZEPAM 1 MG/1
TABLET ORAL
Qty: 60 TABLET | Refills: 0 | Status: SHIPPED | OUTPATIENT
Start: 2020-04-21 | End: 2020-06-11 | Stop reason: SDUPTHER

## 2020-06-11 ENCOUNTER — OFFICE VISIT (OUTPATIENT)
Dept: FAMILY MEDICINE CLINIC | Facility: CLINIC | Age: 50
End: 2020-06-11

## 2020-06-11 VITALS
SYSTOLIC BLOOD PRESSURE: 120 MMHG | TEMPERATURE: 98.7 F | OXYGEN SATURATION: 96 % | HEIGHT: 67 IN | WEIGHT: 290.4 LBS | HEART RATE: 86 BPM | BODY MASS INDEX: 45.58 KG/M2 | DIASTOLIC BLOOD PRESSURE: 78 MMHG

## 2020-06-11 DIAGNOSIS — F41.9 ANXIETY: ICD-10-CM

## 2020-06-11 DIAGNOSIS — M79.672 LEFT FOOT PAIN: Primary | ICD-10-CM

## 2020-06-11 DIAGNOSIS — E78.5 HYPERLIPIDEMIA, UNSPECIFIED HYPERLIPIDEMIA TYPE: ICD-10-CM

## 2020-06-11 PROCEDURE — 99214 OFFICE O/P EST MOD 30 MIN: CPT | Performed by: INTERNAL MEDICINE

## 2020-06-11 RX ORDER — SIMVASTATIN 20 MG
20 TABLET ORAL NIGHTLY
Qty: 90 TABLET | Refills: 3 | Status: SHIPPED | OUTPATIENT
Start: 2020-06-11 | End: 2022-12-17 | Stop reason: SDUPTHER

## 2020-06-11 RX ORDER — LIDOCAINE 50 MG/G
1 PATCH TOPICAL EVERY 24 HOURS
Qty: 15 PATCH | Refills: 0 | Status: SHIPPED | OUTPATIENT
Start: 2020-06-11 | End: 2020-08-31 | Stop reason: SDUPTHER

## 2020-06-11 RX ORDER — SYRINGE WITH NEEDLE, 1 ML 25GX5/8"
SYRINGE, EMPTY DISPOSABLE MISCELLANEOUS
COMMUNITY
Start: 2020-05-17 | End: 2022-11-18

## 2020-06-11 RX ORDER — CLONAZEPAM 1 MG/1
1 TABLET ORAL 2 TIMES DAILY PRN
Qty: 60 TABLET | Refills: 0 | Status: SHIPPED | OUTPATIENT
Start: 2020-06-11 | End: 2020-07-16 | Stop reason: SDUPTHER

## 2020-06-11 NOTE — PROGRESS NOTES
Subjective   Misael Luna is a 50 y.o. male.     Chief Complaint   Patient presents with   • knot on foot     left       History of Present Illness   Complains of 1 month of pain in the top of the right foot of sudden onset with no history of fall, trip, injury or other incident to start.  Hurts to press on it, rub on it or when shoe pushes on it.  Has been swelling the top of the foot.  Not taking anything for it other than the voltaren gel with no relief.  Cannot take NSAIDS secondary to transplant and has been on steroids for years.    The following portions of the patient's history were reviewed and updated as appropriate: allergies, current medications, past family history, past medical history, past social history, past surgical history and problem list.    Depression Screen:  No flowsheet data found.    Past Medical History:   Diagnosis Date   • Asthma    • Disorder of skin    • Generalized osteoarthritis    • Hematuria    • High risk sexual behavior    • History of closed fracture     Lower end of forearm, unspecified laterality    • History of kidney transplant     Kidney replaced by transplant   • Hypertension    • Incomplete emptying of bladder    • Obesity    • Penile lesion    • Tinea cruris    • Urinary obstruction    • Urinary tract infection        Past Surgical History:   Procedure Laterality Date   • BLADDER SURGERY      bladder augmentation   • NEPHRECTOMY     • TRANSPLANTATION RENAL  1991       Family History   Problem Relation Age of Onset   • Arthritis Mother    • Asthma Mother    • Arthritis Father    • Asthma Father    • Heart disease Father    • COPD Father    • Hyperlipidemia Father    • Arthritis Maternal Grandmother    • Osteoporosis Maternal Grandfather    • Arthritis Maternal Grandfather    • No Known Problems Other        Social History     Socioeconomic History   • Marital status:      Spouse name: Not on file   • Number of children: Not on file   • Years of education: Not  "on file   • Highest education level: Not on file   Tobacco Use   • Smoking status: Never Smoker   • Smokeless tobacco: Never Used   Substance and Sexual Activity   • Alcohol use: No     Frequency: Never   • Drug use: No       Current Outpatient Medications   Medication Sig Dispense Refill   • albuterol (ACCUNEB) 1.25 MG/3ML nebulizer solution Take 3 mL by nebulization Every 6 (Six) Hours As Needed for Wheezing. 120 vial 6   • albuterol sulfate  (90 Base) MCG/ACT inhaler Inhale 2 puffs Every 4 (Four) Hours As Needed for Wheezing. 1 inhaler 5   • amoxicillin-clavulanate (AUGMENTIN) 875-125 MG per tablet Take 1 tablet by mouth 2 (Two) Times a Day. 20 tablet 0   • azaTHIOprine (IMURAN) 50 MG tablet 150 mg.     • B-D 3CC LUER-DOMINGUEZ SYR 22GX1\" 22G X 1\" 3 ML misc      • B-D HYPODERMIC NEEDLE 18GX1.5\" 18G X 1-1/2\" misc USE Q  2 WEEKS  3   • BD HYPODERMIC NEEDLE 18G X 1\" misc      • ciprofloxacin (CIPRO) 500 MG tablet Take 500 mg by mouth 2 (Two) Times a Day.     • clonazePAM (KlonoPIN) 1 MG tablet Take 1 tablet by mouth 2 (Two) Times a Day As Needed for Anxiety. 60 tablet 0   • diclofenac (VOLTAREN) 1 % gel gel Apply 4 g topically to the appropriate area as directed 4 (Four) Times a Day As Needed (ankle/joint pain). 100 g 1   • Fluticasone Furoate 100 MCG/ACT aerosol powder  Inhale 1 puff Daily. Rinse mouth with water after use. 30 each 5   • furosemide (LASIX) 20 MG tablet      • gabapentin (NEURONTIN) 100 MG capsule Take 100 mg by mouth 3 (Three) Times a Day.  2   • oxymorphone (OPANA) 10 MG tablet TAKE one-half TO ONE TABLET BY MOUTH FOUR TIMES DAILY AS NEEDED FOR PAIN & NEXT FILL DATE 8/20/2019  0   • predniSONE (DELTASONE) 10 MG tablet      • simvastatin (Zocor) 20 MG tablet Take 1 tablet by mouth Every Night. 90 tablet 3   • Testosterone Cypionate (DEPOTESTOTERONE CYPIONATE) 200 MG/ML injection      • B-D 3CC LUER-DOMINGUEZ SYR 23GX1\" 23G X 1\" 3 ML misc      • lidocaine (LIDODERM) 5 % Place 1 patch on the skin as " "directed by provider Daily. Remove & Discard patch within 12 hours or as directed by MD 15 patch 0     No current facility-administered medications for this visit.        Review of Systems   Constitutional: Negative for activity change, appetite change, fatigue, fever, unexpected weight gain and unexpected weight loss.   HENT: Negative for nosebleeds, rhinorrhea, trouble swallowing and voice change.    Eyes: Negative for visual disturbance.   Respiratory: Negative for cough, chest tightness, shortness of breath and wheezing.    Cardiovascular: Negative for chest pain, palpitations and leg swelling.   Gastrointestinal: Negative for abdominal pain, blood in stool, constipation, diarrhea, nausea, vomiting, GERD and indigestion.        Obese abdomen    Genitourinary: Negative for dysuria, frequency and hematuria.   Musculoskeletal: Negative for arthralgias, back pain and myalgias.         strength in the left hand is 3/5 and decreased sensation in the left hand/ fingers and left arm.    Skin: Negative for rash and bruise.   Neurological: Negative for dizziness, tremors, weakness, light-headedness, numbness, headache and memory problem.   Hematological: Negative for adenopathy. Does not bruise/bleed easily.   Psychiatric/Behavioral: Negative for sleep disturbance and depressed mood. The patient is not nervous/anxious.        Objective   /78 (BP Location: Right arm, Patient Position: Sitting, Cuff Size: Large Adult)   Pulse 86   Temp 98.7 °F (37.1 °C) (Temporal)   Ht 170.2 cm (67.01\")   Wt 132 kg (290 lb 6.4 oz)   SpO2 96%   BMI 45.47 kg/m²     Physical Exam   Constitutional: He is oriented to person, place, and time. He appears well-developed and well-nourished. No distress.   Eyes: Pupils are equal, round, and reactive to light. EOM are normal.   Cardiovascular: Normal rate and regular rhythm.   Pulmonary/Chest: Effort normal. No respiratory distress.   Musculoskeletal:   Top of left foot with swelling " at the base of the third metatarsal region on the dorsal aspect that is tender but no erythema or crepitus.  Patient walking favoring his left foot some but not severely.   Neurological: He is alert and oriented to person, place, and time.   Skin: He is not diaphoretic.   Psychiatric: He has a normal mood and affect. His behavior is normal. Judgment and thought content normal.       No results found for this or any previous visit (from the past 2016 hour(s)).  Assessment/Plan   Misael was seen today for knot on foot.    Diagnoses and all orders for this visit:    Left foot pain  -     XR Foot 3+ View Left; Future  -     lidocaine (LIDODERM) 5 %; Place 1 patch on the skin as directed by provider Daily. Remove & Discard patch within 12 hours or as directed by MD    Anxiety  -     clonazePAM (KlonoPIN) 1 MG tablet; Take 1 tablet by mouth 2 (Two) Times a Day As Needed for Anxiety.    Hyperlipidemia, unspecified hyperlipidemia type  -     simvastatin (Zocor) 20 MG tablet; Take 1 tablet by mouth Every Night.    Dorsum of left foot pain sudden onset 1 month will obtain x-ray of the foot and given lidocaine patch to place over the area as above.  Concerned that there may be a fracture secondary to the past steroid chronic use.  If suggestive with the x-ray then will refer to podiatry otherwise if no problems and improvement then will observe.  No anti-inflammatories secondary renal transplant.  Anxiety appears to be stable and controlled we will continue the clonazepam as ordered above.  Finesse from 4/20/2020 reviewed.  Risks of the medication include but are not limited to fatigue, somnolence, increased risk of falls, constipation, allergic reaction, dependence, and addiction.

## 2020-06-16 ENCOUNTER — TELEPHONE (OUTPATIENT)
Dept: FAMILY MEDICINE CLINIC | Facility: CLINIC | Age: 50
End: 2020-06-16

## 2020-06-16 NOTE — TELEPHONE ENCOUNTER
PT CALLED REQUESTING XRAY RESULTS COMPLETED ON 06/11.     PLEASE ADVISE     PT CALL BACK   [phone]

## 2020-06-18 NOTE — TELEPHONE ENCOUNTER
PATIENT CALLED AND STATED HE CALLED A FEW DAYS AGO AND WAS TOLD SOMEONE WOULD BE IN CONTACT WITH HIM REGARDING HIS X-RAY RESULTS. HE STATES HE WOULD LIKE TO KNOW IF THE RESULTS ARE IN AND IF SO WHAT THE RESULTS SAY. PLEASE ADVISE.     PATIENT CALL BACK [phone]

## 2020-07-16 ENCOUNTER — OFFICE VISIT (OUTPATIENT)
Dept: FAMILY MEDICINE CLINIC | Facility: CLINIC | Age: 50
End: 2020-07-16

## 2020-07-16 VITALS
TEMPERATURE: 98.6 F | HEART RATE: 82 BPM | HEIGHT: 67 IN | DIASTOLIC BLOOD PRESSURE: 76 MMHG | SYSTOLIC BLOOD PRESSURE: 126 MMHG | OXYGEN SATURATION: 98 % | BODY MASS INDEX: 45.47 KG/M2

## 2020-07-16 DIAGNOSIS — F41.9 ANXIETY: ICD-10-CM

## 2020-07-16 DIAGNOSIS — M79.641 RIGHT HAND PAIN: ICD-10-CM

## 2020-07-16 DIAGNOSIS — J45.40 MODERATE PERSISTENT ASTHMA, UNSPECIFIED WHETHER COMPLICATED: ICD-10-CM

## 2020-07-16 DIAGNOSIS — Z79.52 LONG TERM SYSTEMIC STEROID USER: Primary | ICD-10-CM

## 2020-07-16 PROCEDURE — 99214 OFFICE O/P EST MOD 30 MIN: CPT | Performed by: INTERNAL MEDICINE

## 2020-07-16 RX ORDER — CLONAZEPAM 1 MG/1
1 TABLET ORAL 2 TIMES DAILY PRN
Qty: 60 TABLET | Refills: 0 | Status: SHIPPED | OUTPATIENT
Start: 2020-07-16 | End: 2020-08-24

## 2020-07-16 RX ORDER — FLUTICASONE PROPIONATE 220 UG/1
1 AEROSOL, METERED RESPIRATORY (INHALATION)
Qty: 1 INHALER | Refills: 11 | Status: SHIPPED | OUTPATIENT
Start: 2020-07-16 | End: 2020-12-07 | Stop reason: SDUPTHER
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
## 2020-07-22 ENCOUNTER — TELEPHONE (OUTPATIENT)
Dept: FAMILY MEDICINE CLINIC | Facility: CLINIC | Age: 50
End: 2020-07-22

## 2020-07-22 NOTE — TELEPHONE ENCOUNTER
Do you have these results? If not I will call Aleyda Myles to get this report. Patient also wants copy sent to him

## 2020-07-22 NOTE — TELEPHONE ENCOUNTER
Patient called to check the status of his xray results from last week.   His phone number is [phone].

## 2020-07-23 RX ORDER — COLCHICINE 0.6 MG/1
TABLET ORAL
Qty: 6 TABLET | Refills: 0 | Status: SHIPPED | OUTPATIENT
Start: 2020-07-23 | End: 2022-11-17

## 2020-07-23 NOTE — TELEPHONE ENCOUNTER
Discussed with patient.  X-ray no fracture noted.  I am concerned he may be having a gout flare with his history of renal disease and transplant and on diuretics.  We will try course of colchicine.  If he does not respond to this and he continues with pain and swelling in the hand then will refer to hand surgery.

## 2020-07-29 ENCOUNTER — HOSPITAL ENCOUNTER (OUTPATIENT)
Dept: BONE DENSITY | Facility: HOSPITAL | Age: 50
Discharge: HOME | End: 2020-07-29

## 2020-07-29 DIAGNOSIS — Z79.52 LONG TERM SYSTEMIC STEROID USER: ICD-10-CM

## 2020-08-22 DIAGNOSIS — F41.9 ANXIETY: ICD-10-CM

## 2020-08-24 RX ORDER — CLONAZEPAM 1 MG/1
TABLET ORAL
Qty: 60 TABLET | Refills: 0 | Status: SHIPPED | OUTPATIENT
Start: 2020-08-24 | End: 2020-09-28

## 2020-08-31 ENCOUNTER — OFFICE VISIT (OUTPATIENT)
Dept: FAMILY MEDICINE CLINIC | Facility: CLINIC | Age: 50
End: 2020-08-31

## 2020-08-31 VITALS
SYSTOLIC BLOOD PRESSURE: 132 MMHG | DIASTOLIC BLOOD PRESSURE: 84 MMHG | TEMPERATURE: 99 F | HEART RATE: 69 BPM | HEIGHT: 67 IN | OXYGEN SATURATION: 95 % | BODY MASS INDEX: 45.48 KG/M2

## 2020-08-31 DIAGNOSIS — R10.819 SUPRAPUBIC TENDERNESS: ICD-10-CM

## 2020-08-31 DIAGNOSIS — R33.9 INCOMPLETE EMPTYING OF BLADDER: Primary | ICD-10-CM

## 2020-08-31 DIAGNOSIS — M79.672 LEFT FOOT PAIN: ICD-10-CM

## 2020-08-31 PROCEDURE — 99214 OFFICE O/P EST MOD 30 MIN: CPT | Performed by: INTERNAL MEDICINE

## 2020-08-31 RX ORDER — LIDOCAINE 50 MG/G
1 PATCH TOPICAL EVERY 24 HOURS
Qty: 15 PATCH | Refills: 3 | Status: SHIPPED | OUTPATIENT
Start: 2020-08-31

## 2020-08-31 NOTE — PROGRESS NOTES
Subjective   Misael Luna is a 50 y.o. male.     Chief Complaint   Patient presents with   • long term disability     needs paper work filled out        History of Present Illness   Here for disability forms and follow up for the urinary obstruction and incomplete voiding with history of renal transplant and depression.  Has continued recurrent urinary tract infections and Dr Bruno is treating the most recent with cipro and near complete with the course.  Still doing the self catheterization requiring laying in a prone position.  Has pain with catheterization that persists for 45 min after.  Still with blood in the urine and abdomen pain with the urinary retention.  Followed by a renal transplant team.  Patient has been on daily steroid since kidney transplant in 1991 and history of osteopenia by bone density 5 years ago.    The following portions of the patient's history were reviewed and updated as appropriate: allergies, current medications, past family history, past medical history, past social history, past surgical history and problem list.    Depression Screen:  No flowsheet data found.    Past Medical History:   Diagnosis Date   • Asthma    • Disorder of skin    • Generalized osteoarthritis    • Hematuria    • High risk sexual behavior    • History of closed fracture     Lower end of forearm, unspecified laterality    • History of kidney transplant     Kidney replaced by transplant   • Hypertension    • Incomplete emptying of bladder    • Obesity    • Penile lesion    • Tinea cruris    • Urinary obstruction    • Urinary tract infection        Past Surgical History:   Procedure Laterality Date   • BLADDER SURGERY      bladder augmentation   • NEPHRECTOMY     • TRANSPLANTATION RENAL  1991       Family History   Problem Relation Age of Onset   • Arthritis Mother    • Asthma Mother    • Arthritis Father    • Asthma Father    • Heart disease Father    • COPD Father    • Hyperlipidemia Father    • Arthritis  "Maternal Grandmother    • Osteoporosis Maternal Grandfather    • Arthritis Maternal Grandfather    • No Known Problems Other        Social History     Socioeconomic History   • Marital status:      Spouse name: Not on file   • Number of children: Not on file   • Years of education: Not on file   • Highest education level: Not on file   Tobacco Use   • Smoking status: Never Smoker   • Smokeless tobacco: Never Used   Substance and Sexual Activity   • Alcohol use: No     Frequency: Never   • Drug use: No       Current Outpatient Medications   Medication Sig Dispense Refill   • albuterol (ACCUNEB) 1.25 MG/3ML nebulizer solution Take 3 mL by nebulization Every 6 (Six) Hours As Needed for Wheezing. 120 vial 6   • albuterol sulfate  (90 Base) MCG/ACT inhaler Inhale 2 puffs Every 4 (Four) Hours As Needed for Wheezing. 1 inhaler 5   • amoxicillin-clavulanate (AUGMENTIN) 875-125 MG per tablet Take 1 tablet by mouth 2 (Two) Times a Day. 20 tablet 0   • azaTHIOprine (IMURAN) 50 MG tablet 150 mg.     • B-D 3CC LUER-DOMINGUEZ SYR 22GX1\" 22G X 1\" 3 ML misc      • B-D 3CC LUER-DOMINGUEZ SYR 23GX1\" 23G X 1\" 3 ML misc      • B-D HYPODERMIC NEEDLE 18GX1.5\" 18G X 1-1/2\" misc USE Q  2 WEEKS  3   • BD HYPODERMIC NEEDLE 18G X 1\" misc      • clonazePAM (KlonoPIN) 1 MG tablet TAKE ONE TABLET BY MOUTH TWICE A DAY AS NEEDED FOR ANXIETY 60 tablet 0   • fluticasone (Flovent HFA) 220 MCG/ACT inhaler Inhale 1 puff 2 (Two) Times a Day. 1 inhaler 11   • furosemide (LASIX) 20 MG tablet      • gabapentin (NEURONTIN) 100 MG capsule Take 100 mg by mouth 3 (Three) Times a Day.  2   • lidocaine (LIDODERM) 5 % Place 1 patch on the skin as directed by provider Daily. Remove & Discard patch within 12 hours or as directed by MD 15 patch 3   • oxymorphone (OPANA) 10 MG tablet TAKE one-half TO ONE TABLET BY MOUTH FOUR TIMES DAILY AS NEEDED FOR PAIN & NEXT FILL DATE 8/20/2019  0   • predniSONE (DELTASONE) 10 MG tablet      • simvastatin (Zocor) 20 MG tablet " Take 1 tablet by mouth Every Night. 90 tablet 3   • Spacer/Aero-Holding Chambers device Use as directed with the inhalers 1 each 0   • Testosterone Cypionate (DEPOTESTOTERONE CYPIONATE) 200 MG/ML injection      • ciprofloxacin (CIPRO) 500 MG tablet Take 500 mg by mouth 2 (Two) Times a Day.     • colchicine (Colcrys) 0.6 MG tablet Take 2 tabs initially then may take 1 tab 1 hour later. 6 tablet 0   • diclofenac (VOLTAREN) 1 % gel gel Apply 4 g topically to the appropriate area as directed 4 (Four) Times a Day As Needed (ankle/joint pain). 100 g 1     No current facility-administered medications for this visit.        Review of Systems   Constitutional: Negative for activity change, appetite change, fatigue, fever, unexpected weight gain and unexpected weight loss.   HENT: Negative for nosebleeds, rhinorrhea, trouble swallowing and voice change.    Eyes: Negative for visual disturbance.   Respiratory: Negative for cough, chest tightness, shortness of breath and wheezing.    Cardiovascular: Negative for chest pain, palpitations and leg swelling.   Gastrointestinal: Positive for abdominal pain. Negative for blood in stool, constipation, diarrhea, nausea, vomiting, GERD and indigestion.   Genitourinary: Negative for frequency and hematuria.        Chronic need for self-catheterization secondary to urinary retention.  Suprapubic tenderness chronic.   Musculoskeletal: Negative for arthralgias, back pain and myalgias.        Mild swelling in the base of the right hand fingers chronically.   Skin: Negative for rash and bruise.   Neurological: Negative for dizziness, tremors, weakness, light-headedness, numbness, headache and memory problem.   Hematological: Negative for adenopathy. Does not bruise/bleed easily.   Psychiatric/Behavioral: Negative for sleep disturbance and depressed mood. The patient is not nervous/anxious.        Objective   /84 (BP Location: Right arm, Patient Position: Sitting, Cuff Size: Large Adult)  "  Pulse 69   Temp 99 °F (37.2 °C)   Ht 170.2 cm (67\")   SpO2 95%   BMI 45.48 kg/m²     Physical Exam   Constitutional: He is oriented to person, place, and time. He appears well-developed and well-nourished. No distress.   HENT:   Head: Normocephalic and atraumatic.   Right Ear: External ear normal.   Left Ear: External ear normal.   Nose: Nose normal.   Mouth/Throat: Oropharynx is clear and moist.   Eyes: Pupils are equal, round, and reactive to light. Conjunctivae and EOM are normal.   Neck: Normal range of motion. Neck supple. No tracheal deviation present. No thyromegaly present.   Cardiovascular: Normal rate, regular rhythm, normal heart sounds and intact distal pulses. Exam reveals no gallop and no friction rub.   No murmur heard.  Pulmonary/Chest: Effort normal and breath sounds normal. No respiratory distress.   Abdominal: Soft. Bowel sounds are normal. He exhibits no mass. There is no tenderness. There is no guarding.   Suprapubic tenderness.  Obese abdomen   Musculoskeletal: Normal range of motion. He exhibits no edema.   Walking with a mild limp favoring the left leg.  Right hand with some mild swelling in the base of the fingers with some mild tenderness but no lesions or rashes.   Lymphadenopathy:     He has no cervical adenopathy.   Neurological: He is alert and oriented to person, place, and time. He displays normal reflexes. He exhibits normal muscle tone.   Skin: Skin is warm and dry. Capillary refill takes less than 2 seconds. No rash noted. He is not diaphoretic.   Psychiatric: He has a normal mood and affect. His behavior is normal. Judgment and thought content normal.   Nursing note and vitals reviewed.      No results found for this or any previous visit (from the past 2016 hour(s)).  Assessment/Plan   Misael was seen today for long term disability.    Diagnoses and all orders for this visit:    Incomplete emptying of bladder    Left foot pain  -     lidocaine (LIDODERM) 5 %; Place 1 patch " on the skin as directed by provider Daily. Remove & Discard patch within 12 hours or as directed by MD    Suprapubic tenderness    Continue current medication disability forms were completed in the office and faxed to the [employer].  Continue current medications unchanged.  Follow-up with urology, pain management, renal transplant team with .

## 2020-09-28 DIAGNOSIS — F41.9 ANXIETY: ICD-10-CM

## 2020-09-28 RX ORDER — CLONAZEPAM 1 MG/1
TABLET ORAL
Qty: 60 TABLET | Refills: 1 | Status: SHIPPED | OUTPATIENT
Start: 2020-09-28 | End: 2020-12-07 | Stop reason: SDUPTHER

## 2020-12-07 ENCOUNTER — OFFICE VISIT (OUTPATIENT)
Dept: FAMILY MEDICINE CLINIC | Facility: CLINIC | Age: 50
End: 2020-12-07

## 2020-12-07 VITALS
OXYGEN SATURATION: 98 % | HEART RATE: 80 BPM | TEMPERATURE: 97.9 F | HEIGHT: 67 IN | BODY MASS INDEX: 45.47 KG/M2 | DIASTOLIC BLOOD PRESSURE: 88 MMHG | SYSTOLIC BLOOD PRESSURE: 134 MMHG

## 2020-12-07 DIAGNOSIS — Z23 IMMUNIZATION DUE: ICD-10-CM

## 2020-12-07 DIAGNOSIS — F41.9 ANXIETY: ICD-10-CM

## 2020-12-07 DIAGNOSIS — J45.40 MODERATE PERSISTENT ASTHMA, UNSPECIFIED WHETHER COMPLICATED: Primary | ICD-10-CM

## 2020-12-07 DIAGNOSIS — Z23 NEED FOR IMMUNIZATION AGAINST INFLUENZA: ICD-10-CM

## 2020-12-07 DIAGNOSIS — E78.5 HYPERLIPIDEMIA, UNSPECIFIED HYPERLIPIDEMIA TYPE: ICD-10-CM

## 2020-12-07 DIAGNOSIS — F32.A DEPRESSION, UNSPECIFIED DEPRESSION TYPE: ICD-10-CM

## 2020-12-07 PROCEDURE — 99214 OFFICE O/P EST MOD 30 MIN: CPT | Performed by: INTERNAL MEDICINE

## 2020-12-07 RX ORDER — FLUTICASONE PROPIONATE 220 UG/1
1 AEROSOL, METERED RESPIRATORY (INHALATION)
Qty: 1 INHALER | Refills: 11 | Status: SHIPPED | OUTPATIENT
Start: 2020-12-07 | End: 2021-02-25

## 2020-12-07 RX ORDER — ALBUTEROL SULFATE 1.25 MG/3ML
1 SOLUTION RESPIRATORY (INHALATION) EVERY 6 HOURS PRN
Qty: 120 VIAL | Refills: 6 | Status: SHIPPED | OUTPATIENT
Start: 2020-12-07 | End: 2021-02-25 | Stop reason: SDUPTHER

## 2020-12-07 RX ORDER — CLONAZEPAM 1 MG/1
1 TABLET ORAL 2 TIMES DAILY PRN
Qty: 60 TABLET | Refills: 1 | Status: SHIPPED | OUTPATIENT
Start: 2020-12-07 | End: 2021-02-22 | Stop reason: SDUPTHER

## 2020-12-07 NOTE — PROGRESS NOTES
Subjective   Misael Luna is a 50 y.o. male.     Chief Complaint   Patient presents with   • Hyperlipidemia   • Flu Vaccine       History of Present Illness   Follow-up for cholesterol.  Currently, has been feeling well without any myalgias, muscle aches, weakness, numbness, chest pain, short of breath or other issues.  Currently, is adherent with medication regimen of simvastatin 20 mg daily and denies medication side effects. Is due for lab follow-up.    Follow-up for anxiety, depression and panic attacks.  Using clonazepam 1 mg PRN.    Follow up for the urinary obstruction and incomplete voiding with history of renal transplant and depression.  Has continued recurrent urinary tract infections and Dr Bruno is treating the most recent with cipro and near complete with the course.  Still doing the self catheterization requiring laying in a prone position.  Has pain with catheterization that persists for 45 min after.  Still with blood in the urine and abdomen pain with the urinary retention.  Followed by a renal transplant team.  Patient has been on daily steroid since kidney transplant in 1991 and history of osteopenia by bone density 5 years ago.    Follow up for asthma.  Insurance is not covering many inhalers.  Flovent is best covered and worked the best for him.    The following portions of the patient's history were reviewed and updated as appropriate: allergies, current medications, past family history, past medical history, past social history, past surgical history and problem list.    Depression Screen:  No flowsheet data found.    Past Medical History:   Diagnosis Date   • Asthma    • Disorder of skin    • Generalized osteoarthritis    • Hematuria    • High risk sexual behavior    • History of closed fracture     Lower end of forearm, unspecified laterality    • History of kidney transplant     Kidney replaced by transplant   • Hypertension    • Incomplete emptying of bladder    • Obesity    • Penile  "lesion    • Tinea cruris    • Urinary obstruction    • Urinary tract infection        Past Surgical History:   Procedure Laterality Date   • BLADDER SURGERY      bladder augmentation   • NEPHRECTOMY     • TRANSPLANTATION RENAL  1991       Family History   Problem Relation Age of Onset   • Arthritis Mother    • Asthma Mother    • Arthritis Father    • Asthma Father    • Heart disease Father    • COPD Father    • Hyperlipidemia Father    • Arthritis Maternal Grandmother    • Osteoporosis Maternal Grandfather    • Arthritis Maternal Grandfather    • No Known Problems Other        Social History     Socioeconomic History   • Marital status:      Spouse name: Not on file   • Number of children: Not on file   • Years of education: Not on file   • Highest education level: Not on file   Tobacco Use   • Smoking status: Never Smoker   • Smokeless tobacco: Never Used   Substance and Sexual Activity   • Alcohol use: No     Frequency: Never   • Drug use: No       Current Outpatient Medications   Medication Sig Dispense Refill   • albuterol (ACCUNEB) 1.25 MG/3ML nebulizer solution Take 3 mL by nebulization Every 6 (Six) Hours As Needed for Wheezing. 120 vial 6   • albuterol sulfate  (90 Base) MCG/ACT inhaler Inhale 2 puffs Every 4 (Four) Hours As Needed for Wheezing. 1 inhaler 5   • azaTHIOprine (IMURAN) 50 MG tablet 150 mg.     • B-D 3CC LUER-DOMINGUEZ SYR 22GX1\" 22G X 1\" 3 ML misc      • B-D 3CC LUER-DOMINGUEZ SYR 23GX1\" 23G X 1\" 3 ML misc      • B-D HYPODERMIC NEEDLE 18GX1.5\" 18G X 1-1/2\" misc USE Q  2 WEEKS  3   • BD HYPODERMIC NEEDLE 18G X 1\" misc      • clonazePAM (KlonoPIN) 1 MG tablet Take 1 tablet by mouth 2 (Two) Times a Day As Needed for Anxiety. 60 tablet 1   • colchicine (Colcrys) 0.6 MG tablet Take 2 tabs initially then may take 1 tab 1 hour later. 6 tablet 0   • diclofenac (VOLTAREN) 1 % gel gel Apply 4 g topically to the appropriate area as directed 4 (Four) Times a Day As Needed (ankle/joint pain). 100 g 1   • " fluticasone (Flovent HFA) 220 MCG/ACT inhaler Inhale 1 puff 2 (Two) Times a Day. 1 inhaler 11   • furosemide (LASIX) 20 MG tablet      • gabapentin (NEURONTIN) 100 MG capsule Take 100 mg by mouth 3 (Three) Times a Day.  2   • lidocaine (LIDODERM) 5 % Place 1 patch on the skin as directed by provider Daily. Remove & Discard patch within 12 hours or as directed by MD 15 patch 3   • oxymorphone (OPANA) 10 MG tablet TAKE one-half TO ONE TABLET BY MOUTH FOUR TIMES DAILY AS NEEDED FOR PAIN & NEXT FILL DATE 8/20/2019  0   • predniSONE (DELTASONE) 10 MG tablet      • simvastatin (Zocor) 20 MG tablet Take 1 tablet by mouth Every Night. 90 tablet 3   • Spacer/Aero-Holding Chambers device Use as directed with the inhalers 1 each 0   • Testosterone Cypionate (DEPOTESTOTERONE CYPIONATE) 200 MG/ML injection      • amoxicillin-clavulanate (AUGMENTIN) 875-125 MG per tablet Take 1 tablet by mouth 2 (Two) Times a Day. 20 tablet 0   • ciprofloxacin (CIPRO) 500 MG tablet Take 500 mg by mouth 2 (Two) Times a Day.       No current facility-administered medications for this visit.        Review of Systems   Constitutional: Negative for activity change, appetite change, fatigue, fever, unexpected weight gain and unexpected weight loss.   HENT: Negative for nosebleeds, rhinorrhea, trouble swallowing and voice change.    Eyes: Negative for visual disturbance.   Respiratory: Negative for cough, chest tightness, shortness of breath and wheezing.    Cardiovascular: Negative for chest pain, palpitations and leg swelling.   Gastrointestinal: Positive for abdominal pain. Negative for blood in stool, constipation, diarrhea, nausea, vomiting, GERD and indigestion.   Genitourinary: Negative for dysuria, frequency and hematuria.         Chronic need for self-catheterization secondary to urinary retention.  Suprapubic tenderness chronic.    Musculoskeletal: Negative for arthralgias, back pain and myalgias.   Skin: Negative for rash and bruise.  "  Neurological: Negative for dizziness, tremors, weakness, light-headedness, numbness, headache and memory problem.   Hematological: Negative for adenopathy. Does not bruise/bleed easily.   Psychiatric/Behavioral: Negative for sleep disturbance and depressed mood. The patient is not nervous/anxious.        Objective   /88 (BP Location: Right arm, Patient Position: Sitting, Cuff Size: Large Adult)   Pulse 80   Temp 97.9 °F (36.6 °C) (Temporal)   Ht 170.2 cm (67.01\")   SpO2 98%   BMI 45.47 kg/m²     Physical Exam  Vitals signs and nursing note reviewed.   Constitutional:       General: He is not in acute distress.     Appearance: He is well-developed. He is obese. He is not diaphoretic.   HENT:      Head: Normocephalic and atraumatic.      Right Ear: External ear normal.      Left Ear: External ear normal.      Nose: Nose normal.   Eyes:      Conjunctiva/sclera: Conjunctivae normal.      Pupils: Pupils are equal, round, and reactive to light.   Neck:      Musculoskeletal: Normal range of motion and neck supple.      Thyroid: No thyromegaly.      Trachea: No tracheal deviation.   Cardiovascular:      Rate and Rhythm: Normal rate and regular rhythm.      Heart sounds: Normal heart sounds. No murmur. No friction rub. No gallop.    Pulmonary:      Effort: Pulmonary effort is normal. No respiratory distress.      Breath sounds: Normal breath sounds.   Abdominal:      General: Bowel sounds are normal.      Palpations: Abdomen is soft. There is no mass.      Tenderness: There is no abdominal tenderness. There is no guarding.      Comments: Chronic suprapubic tenderness.   Musculoskeletal: Normal range of motion.      Comments:  mild limp favoring the left leg   Lymphadenopathy:      Cervical: No cervical adenopathy.   Skin:     General: Skin is warm and dry.      Capillary Refill: Capillary refill takes less than 2 seconds.      Findings: No rash.   Neurological:      Mental Status: He is alert and oriented to " person, place, and time.      Motor: No abnormal muscle tone.      Deep Tendon Reflexes: Reflexes normal.   Psychiatric:         Behavior: Behavior normal.         Thought Content: Thought content normal.         Judgment: Judgment normal.         No results found for this or any previous visit (from the past 2016 hour(s)).  Assessment/Plan   Diagnoses and all orders for this visit:    1. Moderate persistent asthma, unspecified whether complicated (Primary)  -     fluticasone (Flovent HFA) 220 MCG/ACT inhaler; Inhale 1 puff 2 (Two) Times a Day.  Dispense: 1 inhaler; Refill: 11  -     albuterol (ACCUNEB) 1.25 MG/3ML nebulizer solution; Take 3 mL by nebulization Every 6 (Six) Hours As Needed for Wheezing.  Dispense: 120 vial; Refill: 6    2. Hyperlipidemia, unspecified hyperlipidemia type    3. Anxiety  -     clonazePAM (KlonoPIN) 1 MG tablet; Take 1 tablet by mouth 2 (Two) Times a Day As Needed for Anxiety.  Dispense: 60 tablet; Refill: 1    4. Depression, unspecified depression type    5. Need for immunization against influenza  -     FluLaval Quad >6 Months ([id number])    6. Immunization due  -     Pneumococcal Polysaccharide Vaccine 23-Valent Greater Than or Equal To 3yo Subcutaneous / IM    Discussed the asthma and will continue the flovent HFA and use the albuterol as needed.  Will follow up in 6 months for check and medicare wellness.  If no cholesterol level done elsewhere then we will check it.  Flu and pneumovax today.  Continue the current medications otherwise including for the mood.  YU run and reviewed.  Risks of the medication include but are not limited to fatigue, somnolence, increased risk of falls, constipation, allergic reaction, dependence, and addiction.

## 2020-12-08 PROCEDURE — 90686 IIV4 VACC NO PRSV 0.5 ML IM: CPT | Performed by: INTERNAL MEDICINE

## 2020-12-08 PROCEDURE — 90732 PPSV23 VACC 2 YRS+ SUBQ/IM: CPT | Performed by: INTERNAL MEDICINE

## 2020-12-08 PROCEDURE — G0009 ADMIN PNEUMOCOCCAL VACCINE: HCPCS | Performed by: INTERNAL MEDICINE

## 2020-12-08 PROCEDURE — G0008 ADMIN INFLUENZA VIRUS VAC: HCPCS | Performed by: INTERNAL MEDICINE

## 2021-02-22 DIAGNOSIS — F41.9 ANXIETY: ICD-10-CM

## 2021-02-22 RX ORDER — CLONAZEPAM 1 MG/1
1 TABLET ORAL 2 TIMES DAILY PRN
Qty: 60 TABLET | Refills: 0 | Status: SHIPPED | OUTPATIENT
Start: 2021-02-22 | End: 2021-03-25 | Stop reason: SDUPTHER

## 2021-02-25 ENCOUNTER — OFFICE VISIT (OUTPATIENT)
Dept: FAMILY MEDICINE CLINIC | Facility: CLINIC | Age: 51
End: 2021-02-25

## 2021-02-25 VITALS
OXYGEN SATURATION: 97 % | HEART RATE: 87 BPM | DIASTOLIC BLOOD PRESSURE: 78 MMHG | SYSTOLIC BLOOD PRESSURE: 122 MMHG | TEMPERATURE: 98 F | HEIGHT: 67 IN | BODY MASS INDEX: 45.47 KG/M2

## 2021-02-25 DIAGNOSIS — Z12.5 ENCOUNTER FOR SCREENING FOR MALIGNANT NEOPLASM OF PROSTATE: ICD-10-CM

## 2021-02-25 DIAGNOSIS — J45.40 MODERATE PERSISTENT ASTHMA, UNSPECIFIED WHETHER COMPLICATED: ICD-10-CM

## 2021-02-25 DIAGNOSIS — E78.5 HYPERLIPIDEMIA, UNSPECIFIED HYPERLIPIDEMIA TYPE: ICD-10-CM

## 2021-02-25 DIAGNOSIS — N30.01 ACUTE CYSTITIS WITH HEMATURIA: ICD-10-CM

## 2021-02-25 DIAGNOSIS — R31.9 HEMATURIA, UNSPECIFIED TYPE: Primary | ICD-10-CM

## 2021-02-25 DIAGNOSIS — E29.1 TESTOSTERONE DEFICIENCY IN MALE: ICD-10-CM

## 2021-02-25 LAB
BILIRUB BLD-MCNC: NEGATIVE MG/DL
CLARITY, POC: ABNORMAL
COLOR UR: YELLOW
GLUCOSE UR STRIP-MCNC: NEGATIVE MG/DL
KETONES UR QL: NEGATIVE
LEUKOCYTE EST, POC: NEGATIVE
NITRITE UR-MCNC: NEGATIVE MG/ML
PH UR: 6.5 [PH] (ref 5–8)
PROT UR STRIP-MCNC: ABNORMAL MG/DL
RBC # UR STRIP: ABNORMAL /UL
SP GR UR: 1.01 (ref 1–1.03)
UROBILINOGEN UR QL: NORMAL

## 2021-02-25 PROCEDURE — 81003 URINALYSIS AUTO W/O SCOPE: CPT | Performed by: INTERNAL MEDICINE

## 2021-02-25 PROCEDURE — 99214 OFFICE O/P EST MOD 30 MIN: CPT | Performed by: INTERNAL MEDICINE

## 2021-02-25 RX ORDER — ALBUTEROL SULFATE 1.25 MG/3ML
1 SOLUTION RESPIRATORY (INHALATION) EVERY 6 HOURS PRN
Qty: 120 EACH | Refills: 6 | Status: SHIPPED | OUTPATIENT
Start: 2021-02-25 | End: 2021-08-26 | Stop reason: SDUPTHER

## 2021-02-25 RX ORDER — CIPROFLOXACIN 500 MG/1
500 TABLET, FILM COATED ORAL 2 TIMES DAILY
Qty: 14 TABLET | Refills: 0 | Status: SHIPPED | OUTPATIENT
Start: 2021-02-25 | End: 2021-08-26

## 2021-02-25 RX ORDER — ALBUTEROL SULFATE 90 UG/1
2 AEROSOL, METERED RESPIRATORY (INHALATION) EVERY 4 HOURS PRN
Qty: 18 G | Refills: 6 | Status: SHIPPED | OUTPATIENT
Start: 2021-02-25

## 2021-02-25 RX ORDER — OXYCODONE HYDROCHLORIDE 20 MG/1
20 TABLET ORAL EVERY 4 HOURS PRN
COMMUNITY

## 2021-02-25 NOTE — PROGRESS NOTES
Subjective   Misael Luna is a 50 y.o. male.     Chief Complaint   Patient presents with   • Blood in Urine   • Hyperlipidemia   • Hypogonadism       History of Present Illness   Follow up for the urinary obstruction and incomplete voiding with history of renal transplant and depression.  Has continued recurrent urinary tract infections and Dr Bruno is treating the most recent with cipro and near complete with the course.  Still doing the self catheterization requiring laying in a prone position.  Has pain with catheterization that persists for 45 min after.  Still with blood in the urine and abdomen pain with the urinary retention.  Followed by a renal transplant team.  Patient has been on daily steroid since kidney transplant in 1991 and history of osteopenia by bone density 5 years ago.    For the last 1 week with some burning and feeling of need to empty bladder more often and discomfort on urination with some blood in the urine for the last week.  Last UTI treatment was augmentin 3-4 weeks ago.    Follow-up for cholesterol.  Currently, has been feeling well without any myalgias, muscle aches, weakness, numbness, chest pain, short of breath or other issues.  Currently, is adherent with medication regimen of simvastatin 20 mg daily and denies medication side effects. Is due for lab follow-up.     Follow-up for anxiety, depression and panic attacks.  Using clonazepam 1 mg PRN.    The following portions of the patient's history were reviewed and updated as appropriate: allergies, current medications, past family history, past medical history, past social history, past surgical history and problem list.    Depression Screen:  No flowsheet data found.    Past Medical History:   Diagnosis Date   • Asthma    • Disorder of skin    • Generalized osteoarthritis    • Hematuria    • High risk sexual behavior    • History of closed fracture     Lower end of forearm, unspecified laterality    • History of kidney transplant  "    Kidney replaced by transplant   • Hypertension    • Incomplete emptying of bladder    • Obesity    • Penile lesion    • Tinea cruris    • Urinary obstruction    • Urinary tract infection        Past Surgical History:   Procedure Laterality Date   • BLADDER SURGERY      bladder augmentation   • NEPHRECTOMY     • TRANSPLANTATION RENAL  1991       Family History   Problem Relation Age of Onset   • Arthritis Mother    • Asthma Mother    • Arthritis Father    • Asthma Father    • Heart disease Father    • COPD Father    • Hyperlipidemia Father    • Arthritis Maternal Grandmother    • Osteoporosis Maternal Grandfather    • Arthritis Maternal Grandfather    • No Known Problems Other        Social History     Socioeconomic History   • Marital status:      Spouse name: Not on file   • Number of children: Not on file   • Years of education: Not on file   • Highest education level: Not on file   Tobacco Use   • Smoking status: Never Smoker   • Smokeless tobacco: Never Used   Substance and Sexual Activity   • Alcohol use: No     Frequency: Never   • Drug use: No       Current Outpatient Medications   Medication Sig Dispense Refill   • albuterol (ACCUNEB) 1.25 MG/3ML nebulizer solution Take 3 mL by nebulization Every 6 (Six) Hours As Needed for Wheezing. 120 each 6   • albuterol sulfate  (90 Base) MCG/ACT inhaler Inhale 2 puffs Every 4 (Four) Hours As Needed for Wheezing. 18 g 6   • azaTHIOprine (IMURAN) 50 MG tablet 150 mg.     • B-D 3CC LUER-DOMINGUEZ SYR 22GX1\" 22G X 1\" 3 ML misc      • B-D 3CC LUER-DOMINGUEZ SYR 23GX1\" 23G X 1\" 3 ML misc      • B-D HYPODERMIC NEEDLE 18GX1.5\" 18G X 1-1/2\" misc USE Q  2 WEEKS  3   • BD HYPODERMIC NEEDLE 18G X 1\" misc      • clonazePAM (KlonoPIN) 1 MG tablet Take 1 tablet by mouth 2 (Two) Times a Day As Needed for Anxiety. 60 tablet 0   • colchicine (Colcrys) 0.6 MG tablet Take 2 tabs initially then may take 1 tab 1 hour later. 6 tablet 0   • diclofenac (VOLTAREN) 1 % gel gel Apply 4 g " topically to the appropriate area as directed 4 (Four) Times a Day As Needed (ankle/joint pain). 100 g 1   • furosemide (LASIX) 20 MG tablet      • gabapentin (NEURONTIN) 100 MG capsule Take 100 mg by mouth 3 (Three) Times a Day.  2   • lidocaine (LIDODERM) 5 % Place 1 patch on the skin as directed by provider Daily. Remove & Discard patch within 12 hours or as directed by MD 15 patch 3   • oxyCODONE (ROXICODONE) 15 MG immediate release tablet Take 15 mg by mouth Every 4 (Four) Hours As Needed for Moderate Pain .     • predniSONE (DELTASONE) 10 MG tablet      • simvastatin (Zocor) 20 MG tablet Take 1 tablet by mouth Every Night. 90 tablet 3   • Spacer/Aero-Holding Chambers device Use as directed with the inhalers 1 each 0   • Testosterone Cypionate (DEPOTESTOTERONE CYPIONATE) 200 MG/ML injection      • ciprofloxacin (Cipro) 500 MG tablet Take 1 tablet by mouth 2 (Two) Times a Day. 14 tablet 0     No current facility-administered medications for this visit.        Review of Systems   Constitutional: Negative for activity change, appetite change, fatigue, fever, unexpected weight gain and unexpected weight loss.   HENT: Negative for nosebleeds, rhinorrhea, trouble swallowing and voice change.    Eyes: Negative for visual disturbance.   Respiratory: Negative for cough, chest tightness, shortness of breath and wheezing.    Cardiovascular: Negative for chest pain, palpitations and leg swelling.   Gastrointestinal: Positive for abdominal pain. Negative for blood in stool, constipation, diarrhea, nausea, vomiting, GERD and indigestion.   Genitourinary: Positive for difficulty urinating, dysuria and frequency. Negative for hematuria.        Chronic need for self-catheterization secondary to urinary retention.  Suprapubic tenderness chronic.     Musculoskeletal: Negative for arthralgias, back pain and myalgias.   Skin: Negative for rash and bruise.   Neurological: Negative for dizziness, tremors, weakness, light-headedness,  "numbness, headache and memory problem.   Hematological: Negative for adenopathy. Does not bruise/bleed easily.   Psychiatric/Behavioral: Negative for sleep disturbance and depressed mood. The patient is not nervous/anxious.        Objective   /78 (BP Location: Right arm, Patient Position: Sitting, Cuff Size: Large Adult)   Pulse 87   Temp 98 °F (36.7 °C) (Temporal)   Ht 170.2 cm (67.01\")   SpO2 97%   BMI 45.47 kg/m²     Physical Exam  Vitals signs and nursing note reviewed.   Constitutional:       General: He is not in acute distress.     Appearance: He is well-developed. He is obese. He is not diaphoretic.   HENT:      Head: Normocephalic and atraumatic.      Right Ear: External ear normal.      Left Ear: External ear normal.      Nose: Nose normal.   Eyes:      Conjunctiva/sclera: Conjunctivae normal.      Pupils: Pupils are equal, round, and reactive to light.   Neck:      Musculoskeletal: Normal range of motion and neck supple.      Thyroid: No thyromegaly.      Trachea: No tracheal deviation.   Cardiovascular:      Rate and Rhythm: Normal rate and regular rhythm.      Heart sounds: Normal heart sounds. No murmur. No friction rub. No gallop.    Pulmonary:      Effort: Pulmonary effort is normal. No respiratory distress.      Breath sounds: Normal breath sounds.   Abdominal:      General: Bowel sounds are normal.      Palpations: Abdomen is soft. There is no mass.      Tenderness: There is no abdominal tenderness. There is no guarding.      Comments: Chronic suprapubic tenderness.    Musculoskeletal: Normal range of motion.   Lymphadenopathy:      Cervical: No cervical adenopathy.   Skin:     General: Skin is warm and dry.      Capillary Refill: Capillary refill takes less than 2 seconds.      Findings: No rash.   Neurological:      Mental Status: He is alert and oriented to person, place, and time.      Motor: No abnormal muscle tone.      Deep Tendon Reflexes: Reflexes normal.   Psychiatric:         " Behavior: Behavior normal.         Thought Content: Thought content normal.         Judgment: Judgment normal.         Recent Results (from the past 2016 hour(s))   POCT urinalysis dipstick, automated    Collection Time: 02/25/21 11:32 AM    Specimen: Urine   Result Value Ref Range    Color Yellow Yellow, Straw, Dark Yellow, Mackenzie    Clarity, UA Cloudy (A) Clear    Specific Gravity  1.015 1.005 - 1.030    pH, Urine 6.5 5.0 - 8.0    Leukocytes Negative Negative    Nitrite, UA Negative Negative    Protein, POC 2+ (A) Negative mg/dL    Glucose, UA Negative Negative, 1000 mg/dL (3+) mg/dL    Ketones, UA Negative Negative    Urobilinogen, UA Normal Normal    Bilirubin Negative Negative    Blood, UA 1+ (A) Negative     Assessment/Plan   Diagnoses and all orders for this visit:    1. Hematuria, unspecified type (Primary)  -     POCT urinalysis dipstick, automated  -     ciprofloxacin (Cipro) 500 MG tablet; Take 1 tablet by mouth 2 (Two) Times a Day.  Dispense: 14 tablet; Refill: 0  -     Urine Culture - Urine, Urine, Clean Catch    2. Acute cystitis with hematuria  -     ciprofloxacin (Cipro) 500 MG tablet; Take 1 tablet by mouth 2 (Two) Times a Day.  Dispense: 14 tablet; Refill: 0  -     Urine Culture - Urine, Urine, Clean Catch    3. Moderate persistent asthma, unspecified whether complicated  -     albuterol sulfate  (90 Base) MCG/ACT inhaler; Inhale 2 puffs Every 4 (Four) Hours As Needed for Wheezing.  Dispense: 18 g; Refill: 6  -     albuterol (ACCUNEB) 1.25 MG/3ML nebulizer solution; Take 3 mL by nebulization Every 6 (Six) Hours As Needed for Wheezing.  Dispense: 120 each; Refill: 6    4. Hyperlipidemia, unspecified hyperlipidemia type  -     Comprehensive Metabolic Panel  -     Lipid Panel    5. Testosterone deficiency in male  -     CBC & Differential  -     Testosterone (Free & Total), LC / MS  -     PSA Screen    6. Encounter for screening for malignant neoplasm of prostate   -     PSA Screen    Hematuria  with likely UTI given ciprofloxacin 500 mg for the recurrent UTIs.  He is to follow-up with urology Dr. Bruno as scheduled.  Asthma appears to be fairly stable continue the inhaled medications unchanged.  History of low testosterone we will check a testosterone PSA and CBC and forward the results to Dr. Bruno.  No other changes in medications at this time unless otherwise indicated by his test results.           · COVID-19 Precautions - Patient was compliant in wearing a mask. When I saw the patient, I used appropriate personal protective equipment (PPE) including mask and eye shield (standard procedure).  Additionally, I used gown and gloves if indicated.  Hand hygiene was completed before and after seeing the patient.  · Dictated utilizing Dragon Dictation

## 2021-03-04 LAB
ALBUMIN SERPL-MCNC: 4 G/DL (ref 4–5)
ALBUMIN/GLOB SERPL: 1.4 {RATIO} (ref 1.2–2.2)
ALP SERPL-CCNC: 79 IU/L (ref 39–117)
ALT SERPL-CCNC: 10 IU/L (ref 0–44)
AST SERPL-CCNC: 14 IU/L (ref 0–40)
BACTERIA UR CULT: NO GROWTH
BACTERIA UR CULT: NORMAL
BASOPHILS # BLD AUTO: 0.1 X10E3/UL (ref 0–0.2)
BASOPHILS NFR BLD AUTO: 1 %
BILIRUB SERPL-MCNC: 0.2 MG/DL (ref 0–1.2)
BUN SERPL-MCNC: 22 MG/DL (ref 6–24)
BUN/CREAT SERPL: 11 (ref 9–20)
CALCIUM SERPL-MCNC: 9.4 MG/DL (ref 8.7–10.2)
CHLORIDE SERPL-SCNC: 107 MMOL/L (ref 96–106)
CHOLEST SERPL-MCNC: 172 MG/DL (ref 100–199)
CO2 SERPL-SCNC: 22 MMOL/L (ref 20–29)
CREAT SERPL-MCNC: 2 MG/DL (ref 0.76–1.27)
EOSINOPHIL # BLD AUTO: 0.2 X10E3/UL (ref 0–0.4)
EOSINOPHIL NFR BLD AUTO: 1 %
ERYTHROCYTE [DISTWIDTH] IN BLOOD BY AUTOMATED COUNT: 13 % (ref 11.6–15.4)
GLOBULIN SER CALC-MCNC: 2.8 G/DL (ref 1.5–4.5)
GLUCOSE SERPL-MCNC: 72 MG/DL (ref 65–99)
HCT VFR BLD AUTO: 37.5 % (ref 37.5–51)
HDLC SERPL-MCNC: 42 MG/DL
HGB BLD-MCNC: 12.6 G/DL (ref 13–17.7)
IMM GRANULOCYTES # BLD AUTO: 0.3 X10E3/UL (ref 0–0.1)
IMM GRANULOCYTES NFR BLD AUTO: 2 %
LDLC SERPL CALC-MCNC: 90 MG/DL (ref 0–99)
LYMPHOCYTES # BLD AUTO: 3.1 X10E3/UL (ref 0.7–3.1)
LYMPHOCYTES NFR BLD AUTO: 23 %
MCH RBC QN AUTO: 31.9 PG (ref 26.6–33)
MCHC RBC AUTO-ENTMCNC: 33.6 G/DL (ref 31.5–35.7)
MCV RBC AUTO: 95 FL (ref 79–97)
MONOCYTES # BLD AUTO: 1.4 X10E3/UL (ref 0.1–0.9)
MONOCYTES NFR BLD AUTO: 11 %
NEUTROPHILS # BLD AUTO: 8.3 X10E3/UL (ref 1.4–7)
NEUTROPHILS NFR BLD AUTO: 62 %
PLATELET # BLD AUTO: 356 X10E3/UL (ref 150–450)
POTASSIUM SERPL-SCNC: 4.5 MMOL/L (ref 3.5–5.2)
PROT SERPL-MCNC: 6.8 G/DL (ref 6–8.5)
PSA SERPL-MCNC: 1.5 NG/ML (ref 0–4)
RBC # BLD AUTO: 3.95 X10E6/UL (ref 4.14–5.8)
SODIUM SERPL-SCNC: 145 MMOL/L (ref 134–144)
TESTOST FREE SERPL-MCNC: 12.1 PG/ML (ref 7.2–24)
TESTOST SERPL-MCNC: 516.4 NG/DL (ref 264–916)
TRIGL SERPL-MCNC: 238 MG/DL (ref 0–149)
VLDLC SERPL CALC-MCNC: 40 MG/DL (ref 5–40)
WBC # BLD AUTO: 13.5 X10E3/UL (ref 3.4–10.8)

## 2021-03-25 DIAGNOSIS — F41.9 ANXIETY: ICD-10-CM

## 2021-03-26 ENCOUNTER — BULK ORDERING (OUTPATIENT)
Dept: CASE MANAGEMENT | Facility: OTHER | Age: 51
End: 2021-03-26

## 2021-03-26 DIAGNOSIS — Z23 IMMUNIZATION DUE: ICD-10-CM

## 2021-03-26 RX ORDER — CLONAZEPAM 1 MG/1
1 TABLET ORAL 2 TIMES DAILY PRN
Qty: 60 TABLET | Refills: 0 | Status: SHIPPED | OUTPATIENT
Start: 2021-03-26 | End: 2021-05-20 | Stop reason: SDUPTHER

## 2021-05-20 ENCOUNTER — OFFICE VISIT (OUTPATIENT)
Dept: FAMILY MEDICINE CLINIC | Facility: CLINIC | Age: 51
End: 2021-05-20

## 2021-05-20 VITALS
TEMPERATURE: 98.1 F | OXYGEN SATURATION: 97 % | SYSTOLIC BLOOD PRESSURE: 118 MMHG | HEIGHT: 67 IN | HEART RATE: 80 BPM | DIASTOLIC BLOOD PRESSURE: 80 MMHG | BODY MASS INDEX: 45.47 KG/M2

## 2021-05-20 DIAGNOSIS — G47.8 SLEEP PARALYSIS: ICD-10-CM

## 2021-05-20 DIAGNOSIS — F41.9 ANXIETY: ICD-10-CM

## 2021-05-20 DIAGNOSIS — Z12.11 COLON CANCER SCREENING: ICD-10-CM

## 2021-05-20 DIAGNOSIS — K21.9 GASTROESOPHAGEAL REFLUX DISEASE WITHOUT ESOPHAGITIS: Primary | ICD-10-CM

## 2021-05-20 PROCEDURE — 99214 OFFICE O/P EST MOD 30 MIN: CPT | Performed by: INTERNAL MEDICINE

## 2021-05-20 RX ORDER — CLONAZEPAM 1 MG/1
1 TABLET ORAL 2 TIMES DAILY PRN
Qty: 60 TABLET | Refills: 0 | Status: SHIPPED | OUTPATIENT
Start: 2021-05-20 | End: 2021-06-30 | Stop reason: SDUPTHER

## 2021-05-20 RX ORDER — OMEPRAZOLE 40 MG/1
40 CAPSULE, DELAYED RELEASE ORAL DAILY
Qty: 90 CAPSULE | Refills: 1 | Status: SHIPPED | OUTPATIENT
Start: 2021-05-20 | End: 2022-03-24

## 2021-05-20 RX ORDER — TESTOSTERONE 16.2 MG/G
GEL TRANSDERMAL
COMMUNITY
Start: 2021-04-09

## 2021-05-20 NOTE — PROGRESS NOTES
Subjective   Misael Luna is a 51 y.o. male.     Chief Complaint   Patient presents with   • Heartburn       History of Present Illness   Patient has been having GERD symptoms with regurgitation that causes him to have choking on fluids with bad taste in the middle of the night.  No difficulty with wieght loss.  Has had some episodes of food catching and not going down for several years but not all the time.  This seems to be worse with meats and hamburgers.    Patient is here for follow-up.  Asking to have his bone density test performed with history of osteopenia and status post renal transplant with long-term use of steroid placing him at risk for osteoporosis.  Had order for this within last year that is still active.    Patient is due for colon cancer screening.    History of high cholesterol.  Currently, has been feeling well without any myalgias, muscle aches, weakness, numbness, chest pain, short of breath or other issues.  Currently, is adherent with medication regimen of simvastatin 20 mg daily and denies medication side effects. Is due for lab follow-up.    History of urinary obstruction and incomplete voiding with history of renal transplant and depression.  Has continued recurrent urinary tract infections and Dr Bruno is treating the most recent with cipro and near complete with the course.  Still doing the self catheterization requiring laying in a prone position.  Has pain with catheterization that persists for 45 min after.  Still with blood in the urine and abdomen pain with the urinary retention.  Followed by a renal transplant team.  Patient has been on daily steroid since kidney transplant in 1991 and history of osteopenia by bone density on 7/25/2017.     Follow-up for anxiety, depression and panic attacks.  Using clonazepam 1 mg PRN.    The following portions of the patient's history were reviewed and updated as appropriate: allergies, current medications, past family history, past medical  "history, past social history, past surgical history and problem list.    Depression Screen:  No flowsheet data found.    Past Medical History:   Diagnosis Date   • Asthma    • Disorder of skin    • Generalized osteoarthritis    • Hematuria    • High risk sexual behavior    • History of closed fracture     Lower end of forearm, unspecified laterality    • History of kidney transplant     Kidney replaced by transplant   • Hypertension    • Incomplete emptying of bladder    • Obesity    • Penile lesion    • Tinea cruris    • Urinary obstruction    • Urinary tract infection        Past Surgical History:   Procedure Laterality Date   • BLADDER SURGERY      bladder augmentation   • NEPHRECTOMY     • TRANSPLANTATION RENAL  1991       Family History   Problem Relation Age of Onset   • Arthritis Mother    • Asthma Mother    • Arthritis Father    • Asthma Father    • Heart disease Father    • COPD Father    • Hyperlipidemia Father    • Arthritis Maternal Grandmother    • Osteoporosis Maternal Grandfather    • Arthritis Maternal Grandfather    • No Known Problems Other        Social History     Socioeconomic History   • Marital status:      Spouse name: Not on file   • Number of children: Not on file   • Years of education: Not on file   • Highest education level: Not on file   Tobacco Use   • Smoking status: Never Smoker   • Smokeless tobacco: Never Used   Substance and Sexual Activity   • Alcohol use: No   • Drug use: No       Current Outpatient Medications   Medication Sig Dispense Refill   • albuterol (ACCUNEB) 1.25 MG/3ML nebulizer solution Take 3 mL by nebulization Every 6 (Six) Hours As Needed for Wheezing. 120 each 6   • albuterol sulfate  (90 Base) MCG/ACT inhaler Inhale 2 puffs Every 4 (Four) Hours As Needed for Wheezing. 18 g 6   • azaTHIOprine (IMURAN) 50 MG tablet 150 mg.     • B-D 3CC LUER-DOMIGNUEZ SYR 22GX1\" 22G X 1\" 3 ML misc      • B-D 3CC LUER-DOMINGUEZ SYR 23GX1\" 23G X 1\" 3 ML misc      • B-D HYPODERMIC " "NEEDLE 18GX1.5\" 18G X 1-1/2\" misc USE Q  2 WEEKS  3   • BD HYPODERMIC NEEDLE 18G X 1\" misc      • ciprofloxacin (Cipro) 500 MG tablet Take 1 tablet by mouth 2 (Two) Times a Day. 14 tablet 0   • clonazePAM (KlonoPIN) 1 MG tablet Take 1 tablet by mouth 2 (Two) Times a Day As Needed for Anxiety. 60 tablet 0   • colchicine (Colcrys) 0.6 MG tablet Take 2 tabs initially then may take 1 tab 1 hour later. 6 tablet 0   • diclofenac (VOLTAREN) 1 % gel gel Apply 4 g topically to the appropriate area as directed 4 (Four) Times a Day As Needed (ankle/joint pain). 100 g 1   • furosemide (LASIX) 20 MG tablet      • gabapentin (NEURONTIN) 100 MG capsule Take 100 mg by mouth 3 (Three) Times a Day.  2   • lidocaine (LIDODERM) 5 % Place 1 patch on the skin as directed by provider Daily. Remove & Discard patch within 12 hours or as directed by MD 15 patch 3   • oxyCODONE (ROXICODONE) 15 MG immediate release tablet Take 15 mg by mouth Every 4 (Four) Hours As Needed for Moderate Pain .     • predniSONE (DELTASONE) 10 MG tablet      • simvastatin (Zocor) 20 MG tablet Take 1 tablet by mouth Every Night. 90 tablet 3   • Spacer/Aero-Holding Chambers device Use as directed with the inhalers 1 each 0   • omeprazole (priLOSEC) 40 MG capsule Take 1 capsule by mouth Daily. 90 capsule 1   • Testosterone 20.25 MG/ACT (1.62%) gel        No current facility-administered medications for this visit.       Review of Systems   Constitutional: Negative for activity change, appetite change, fatigue, fever, unexpected weight gain and unexpected weight loss.   HENT: Negative for nosebleeds, rhinorrhea, trouble swallowing and voice change.    Eyes: Negative for visual disturbance.   Respiratory: Negative for cough, chest tightness, shortness of breath and wheezing.    Cardiovascular: Negative for chest pain, palpitations and leg swelling.   Gastrointestinal: Positive for abdominal pain. Negative for blood in stool, constipation, diarrhea, nausea, vomiting, " "GERD and indigestion.   Genitourinary: Positive for difficulty urinating, dysuria and frequency. Negative for hematuria.        Chronic need for self-catheterization secondary to urinary retention.  Suprapubic tenderness chronic.      Musculoskeletal: Positive for arthralgias. Negative for back pain and myalgias.   Skin: Negative for rash and bruise.   Neurological: Negative for dizziness, tremors, weakness, light-headedness, numbness, headache and memory problem.   Hematological: Negative for adenopathy. Does not bruise/bleed easily.   Psychiatric/Behavioral: Negative for sleep disturbance and depressed mood. The patient is not nervous/anxious.        Objective   /80 (BP Location: Right arm, Patient Position: Sitting, Cuff Size: Large Adult)   Pulse 80   Temp 98.1 °F (36.7 °C) (Temporal)   Ht 170.2 cm (67.01\")   SpO2 97%   BMI 45.47 kg/m²     Physical Exam  Vitals and nursing note reviewed.   Constitutional:       General: He is not in acute distress.     Appearance: He is well-developed. He is obese. He is not diaphoretic.   HENT:      Head: Normocephalic and atraumatic.      Right Ear: External ear normal.      Left Ear: External ear normal.      Nose: Nose normal.   Eyes:      Conjunctiva/sclera: Conjunctivae normal.      Pupils: Pupils are equal, round, and reactive to light.   Neck:      Thyroid: No thyromegaly.      Trachea: No tracheal deviation.   Cardiovascular:      Rate and Rhythm: Normal rate and regular rhythm.      Heart sounds: Normal heart sounds. No murmur heard.   No friction rub. No gallop.    Pulmonary:      Effort: Pulmonary effort is normal. No respiratory distress.      Breath sounds: Normal breath sounds.   Abdominal:      General: Bowel sounds are normal.      Palpations: Abdomen is soft. There is no mass.      Tenderness: There is no abdominal tenderness. There is no guarding.   Musculoskeletal:         General: Normal range of motion.      Cervical back: Normal range of motion " and neck supple.   Lymphadenopathy:      Cervical: No cervical adenopathy.   Skin:     General: Skin is warm and dry.      Capillary Refill: Capillary refill takes less than 2 seconds.      Findings: No rash.   Neurological:      Mental Status: He is alert and oriented to person, place, and time.      Motor: No abnormal muscle tone.      Deep Tendon Reflexes: Reflexes normal.   Psychiatric:         Behavior: Behavior normal.         Thought Content: Thought content normal.         Judgment: Judgment normal.         Recent Results (from the past 2016 hour(s))   POCT urinalysis dipstick, automated    Collection Time: 02/25/21 11:32 AM    Specimen: Urine   Result Value Ref Range    Color Yellow Yellow, Straw, Dark Yellow, Mackenzie    Clarity, UA Cloudy (A) Clear    Specific Gravity  1.015 1.005 - 1.030    pH, Urine 6.5 5.0 - 8.0    Leukocytes Negative Negative    Nitrite, UA Negative Negative    Protein, POC 2+ (A) Negative mg/dL    Glucose, UA Negative Negative, 1000 mg/dL (3+) mg/dL    Ketones, UA Negative Negative    Urobilinogen, UA Normal Normal    Bilirubin Negative Negative    Blood, UA 1+ (A) Negative   Urine Culture - Urine, Urine, Clean Catch    Collection Time: 02/25/21 11:44 AM    Specimen: Urine, Clean Catch    UC   Result Value Ref Range    Urine Culture Final report     Result 1 No growth    Comprehensive Metabolic Panel    Collection Time: 02/25/21 11:44 AM    Specimen: Blood   Result Value Ref Range    Glucose 72 65 - 99 mg/dL    BUN 22 6 - 24 mg/dL    Creatinine 2.00 (H) 0.76 - 1.27 mg/dL    eGFR Non African Am 38 (L) >59 mL/min/1.73    eGFR  Am 44 (L) >59 mL/min/1.73    BUN/Creatinine Ratio 11 9 - 20    Sodium 145 (H) 134 - 144 mmol/L    Potassium 4.5 3.5 - 5.2 mmol/L    Chloride 107 (H) 96 - 106 mmol/L    Total CO2 22 20 - 29 mmol/L    Calcium 9.4 8.7 - 10.2 mg/dL    Total Protein 6.8 6.0 - 8.5 g/dL    Albumin 4.0 4.0 - 5.0 g/dL    Globulin 2.8 1.5 - 4.5 g/dL    A/G Ratio 1.4 1.2 - 2.2    Total  Bilirubin 0.2 0.0 - 1.2 mg/dL    Alkaline Phosphatase 79 39 - 117 IU/L    AST (SGOT) 14 0 - 40 IU/L    ALT (SGPT) 10 0 - 44 IU/L   Lipid Panel    Collection Time: 02/25/21 11:44 AM    Specimen: Blood   Result Value Ref Range    Total Cholesterol 172 100 - 199 mg/dL    Triglycerides 238 (H) 0 - 149 mg/dL    HDL Cholesterol 42 >39 mg/dL    VLDL Cholesterol Graham 40 5 - 40 mg/dL    LDL Chol Calc (NIH) 90 0 - 99 mg/dL   CBC & Differential    Collection Time: 02/25/21 11:44 AM    Specimen: Blood   Result Value Ref Range    WBC 13.5 (H) 3.4 - 10.8 x10E3/uL    RBC 3.95 (L) 4.14 - 5.80 x10E6/uL    Hemoglobin 12.6 (L) 13.0 - 17.7 g/dL    Hematocrit 37.5 37.5 - 51.0 %    MCV 95 79 - 97 fL    MCH 31.9 26.6 - 33.0 pg    MCHC 33.6 31.5 - 35.7 g/dL    RDW 13.0 11.6 - 15.4 %    Platelets 356 150 - 450 x10E3/uL    Neutrophil Rel % 62 Not Estab. %    Lymphocyte Rel % 23 Not Estab. %    Monocyte Rel % 11 Not Estab. %    Eosinophil Rel % 1 Not Estab. %    Basophil Rel % 1 Not Estab. %    Neutrophils Absolute 8.3 (H) 1.4 - 7.0 x10E3/uL    Lymphocytes Absolute 3.1 0.7 - 3.1 x10E3/uL    Monocytes Absolute 1.4 (H) 0.1 - 0.9 x10E3/uL    Eosinophils Absolute 0.2 0.0 - 0.4 x10E3/uL    Basophils Absolute 0.1 0.0 - 0.2 x10E3/uL    Immature Granulocyte Rel % 2 Not Estab. %    Immature Grans Absolute 0.3 (H) 0.0 - 0.1 x10E3/uL   Testosterone (Free & Total), LC / MS    Collection Time: 02/25/21 11:44 AM    Specimen: Blood   Result Value Ref Range    Testosterone, Total 516.4 264.0 - 916.0 ng/dL    Testosterone, Free 12.1 7.2 - 24.0 pg/mL   PSA Screen    Collection Time: 02/25/21 11:44 AM    Specimen: Blood   Result Value Ref Range    PSA 1.5 0.0 - 4.0 ng/mL     Assessment/Plan   Diagnoses and all orders for this visit:    1. Gastroesophageal reflux disease without esophagitis (Primary)  -     Ambulatory Referral For Screening Colonoscopy  -     omeprazole (priLOSEC) 40 MG capsule; Take 1 capsule by mouth Daily.  Dispense: 90 capsule; Refill:  1    2. Sleep paralysis  -     Ambulatory Referral to Sleep Medicine    3. Colon cancer screening  -     Ambulatory Referral For Screening Colonoscopy    4. Anxiety  -     clonazePAM (KlonoPIN) 1 MG tablet; Take 1 tablet by mouth 2 (Two) Times a Day As Needed for Anxiety.  Dispense: 60 tablet; Refill: 0               · COVID-19 Precautions - Patient was compliant in wearing a mask. When I saw the patient, I used appropriate personal protective equipment (PPE) including mask and eye shield (standard procedure).  Additionally, I used gown and gloves if indicated.  Hand hygiene was completed before and after seeing the patient.  · Dictated utilizing Dragon Dictation

## 2021-05-24 ENCOUNTER — TELEPHONE (OUTPATIENT)
Dept: GASTROENTEROLOGY | Facility: CLINIC | Age: 51
End: 2021-05-24

## 2021-06-30 DIAGNOSIS — F41.9 ANXIETY: ICD-10-CM

## 2021-07-01 RX ORDER — CLONAZEPAM 1 MG/1
1 TABLET ORAL 2 TIMES DAILY PRN
Qty: 60 TABLET | Refills: 0 | Status: SHIPPED | OUTPATIENT
Start: 2021-07-01 | End: 2021-08-26 | Stop reason: SDUPTHER

## 2021-08-26 ENCOUNTER — OFFICE VISIT (OUTPATIENT)
Dept: FAMILY MEDICINE CLINIC | Facility: CLINIC | Age: 51
End: 2021-08-26

## 2021-08-26 VITALS
SYSTOLIC BLOOD PRESSURE: 126 MMHG | DIASTOLIC BLOOD PRESSURE: 76 MMHG | BODY MASS INDEX: 45.47 KG/M2 | HEIGHT: 67 IN | HEART RATE: 77 BPM | OXYGEN SATURATION: 96 % | TEMPERATURE: 97.9 F

## 2021-08-26 DIAGNOSIS — E78.5 HYPERLIPIDEMIA, UNSPECIFIED HYPERLIPIDEMIA TYPE: ICD-10-CM

## 2021-08-26 DIAGNOSIS — R39.89 URINARY PROBLEM: Primary | ICD-10-CM

## 2021-08-26 DIAGNOSIS — K59.03 DRUG-INDUCED CONSTIPATION: ICD-10-CM

## 2021-08-26 DIAGNOSIS — N13.9 URINARY (TRACT) OBSTRUCTION: ICD-10-CM

## 2021-08-26 DIAGNOSIS — N30.01 ACUTE CYSTITIS WITH HEMATURIA: ICD-10-CM

## 2021-08-26 DIAGNOSIS — F41.9 ANXIETY: ICD-10-CM

## 2021-08-26 DIAGNOSIS — R25.2 BILATERAL LEG CRAMPS: ICD-10-CM

## 2021-08-26 DIAGNOSIS — J45.40 MODERATE PERSISTENT ASTHMA, UNSPECIFIED WHETHER COMPLICATED: ICD-10-CM

## 2021-08-26 PROBLEM — K59.00 CONSTIPATION: Status: ACTIVE | Noted: 2021-08-26

## 2021-08-26 LAB
BILIRUB BLD-MCNC: NEGATIVE MG/DL
CLARITY, POC: ABNORMAL
COLOR UR: YELLOW
GLUCOSE UR STRIP-MCNC: NEGATIVE MG/DL
KETONES UR QL: NEGATIVE
LEUKOCYTE EST, POC: NEGATIVE
NITRITE UR-MCNC: NEGATIVE MG/ML
PH UR: 6 [PH] (ref 5–8)
PROT UR STRIP-MCNC: ABNORMAL MG/DL
RBC # UR STRIP: ABNORMAL /UL
SP GR UR: 1.01 (ref 1–1.03)
UROBILINOGEN UR QL: NORMAL

## 2021-08-26 PROCEDURE — 99214 OFFICE O/P EST MOD 30 MIN: CPT | Performed by: INTERNAL MEDICINE

## 2021-08-26 PROCEDURE — 81003 URINALYSIS AUTO W/O SCOPE: CPT | Performed by: INTERNAL MEDICINE

## 2021-08-26 RX ORDER — AMOXICILLIN AND CLAVULANATE POTASSIUM 875; 125 MG/1; MG/1
TABLET, FILM COATED ORAL
COMMUNITY
Start: 2021-08-13 | End: 2022-01-31

## 2021-08-26 RX ORDER — ALBUTEROL SULFATE 1.25 MG/3ML
1 SOLUTION RESPIRATORY (INHALATION) EVERY 6 HOURS PRN
Qty: 120 EACH | Refills: 6 | Status: SHIPPED | OUTPATIENT
Start: 2021-08-26 | End: 2022-05-09 | Stop reason: SDUPTHER

## 2021-08-26 RX ORDER — CIPROFLOXACIN 500 MG/1
500 TABLET, FILM COATED ORAL 2 TIMES DAILY
Qty: 14 TABLET | Refills: 0 | Status: SHIPPED | OUTPATIENT
Start: 2021-08-26 | End: 2023-02-16

## 2021-08-26 RX ORDER — CLONAZEPAM 1 MG/1
1 TABLET ORAL 2 TIMES DAILY PRN
Qty: 60 TABLET | Refills: 0 | Status: SHIPPED | OUTPATIENT
Start: 2021-08-26 | End: 2021-10-04 | Stop reason: SDUPTHER

## 2021-08-26 NOTE — PROGRESS NOTES
Subjective   Misael Luna is a 51 y.o. male.     Chief Complaint   Patient presents with   • Urinary Tract Infection   • muscle cramps       History of Present Illness      Having cramps in the calves for the last 3-4 months that is at any time.    History of urinary obstruction and incomplete voiding with history of renal transplant and depression.  Has continued recurrent urinary tract infections and Dr Bruno is following.  Recently for the last 1-2 weeks with pain with passing self catheter and some blood.  Still doing the self catheterization requiring laying in a prone position.  Has pain with catheterization that persists for 45 min after.  Still with blood in the urine and abdomen pain with the urinary retention.  Followed by a renal transplant team.  Patient has been on daily steroid since kidney transplant in 1991 and history of osteopenia by bone density on 7/25/2017.    History of high cholesterol.  Currently, has been feeling well without any myalgias, muscle aches, weakness, numbness, chest pain, short of breath or other issues.  Currently, is adherent with medication regimen of simvastatin 20 mg daily and denies medication side effects.    The following portions of the patient's history were reviewed and updated as appropriate: allergies, current medications, past family history, past medical history, past social history, past surgical history and problem list.    Follow-up for anxiety, depression and panic attacks.  Using clonazepam 1 mg PRN    Depression Screen:  No flowsheet data found.    Past Medical History:   Diagnosis Date   • Asthma    • Disorder of skin    • Generalized osteoarthritis    • Hematuria    • High risk sexual behavior    • History of closed fracture     Lower end of forearm, unspecified laterality    • History of kidney transplant     Kidney replaced by transplant   • Hypertension    • Incomplete emptying of bladder    • Obesity    • Penile lesion    • Tinea cruris    • Urinary  "obstruction    • Urinary tract infection        Past Surgical History:   Procedure Laterality Date   • BLADDER SURGERY      bladder augmentation   • NEPHRECTOMY     • TRANSPLANTATION RENAL  1991       Family History   Problem Relation Age of Onset   • Arthritis Mother    • Asthma Mother    • Arthritis Father    • Asthma Father    • Heart disease Father    • COPD Father    • Hyperlipidemia Father    • Arthritis Maternal Grandmother    • Osteoporosis Maternal Grandfather    • Arthritis Maternal Grandfather    • No Known Problems Other        Social History     Socioeconomic History   • Marital status:      Spouse name: Not on file   • Number of children: Not on file   • Years of education: Not on file   • Highest education level: Not on file   Tobacco Use   • Smoking status: Never Smoker   • Smokeless tobacco: Never Used   Substance and Sexual Activity   • Alcohol use: No   • Drug use: No       Current Outpatient Medications   Medication Sig Dispense Refill   • albuterol (ACCUNEB) 1.25 MG/3ML nebulizer solution Take 3 mL by nebulization Every 6 (Six) Hours As Needed for Wheezing. 120 each 6   • albuterol sulfate  (90 Base) MCG/ACT inhaler Inhale 2 puffs Every 4 (Four) Hours As Needed for Wheezing. 18 g 6   • amoxicillin-clavulanate (AUGMENTIN) 875-125 MG per tablet      • azaTHIOprine (IMURAN) 50 MG tablet 150 mg.     • B-D 3CC LUER-DOMINGUEZ SYR 22GX1\" 22G X 1\" 3 ML misc      • B-D 3CC LUER-DOMINGUEZ SYR 23GX1\" 23G X 1\" 3 ML misc      • B-D HYPODERMIC NEEDLE 18GX1.5\" 18G X 1-1/2\" misc USE Q  2 WEEKS  3   • BD HYPODERMIC NEEDLE 18G X 1\" misc      • ciprofloxacin (Cipro) 500 MG tablet Take 1 tablet by mouth 2 (Two) Times a Day. 14 tablet 0   • clonazePAM (KlonoPIN) 1 MG tablet Take 1 tablet by mouth 2 (Two) Times a Day As Needed for Anxiety. 60 tablet 0   • colchicine (Colcrys) 0.6 MG tablet Take 2 tabs initially then may take 1 tab 1 hour later. 6 tablet 0   • diclofenac (VOLTAREN) 1 % gel gel Apply 4 g topically to " the appropriate area as directed 4 (Four) Times a Day As Needed (ankle/joint pain). 100 g 1   • furosemide (LASIX) 20 MG tablet      • gabapentin (NEURONTIN) 100 MG capsule Take 100 mg by mouth 3 (Three) Times a Day.  2   • lidocaine (LIDODERM) 5 % Place 1 patch on the skin as directed by provider Daily. Remove & Discard patch within 12 hours or as directed by MD 15 patch 3   • linaclotide (Linzess) 145 MCG capsule capsule Take 1 capsule by mouth Every Morning Before Breakfast. 30 capsule 5   • omeprazole (priLOSEC) 40 MG capsule Take 1 capsule by mouth Daily. 90 capsule 1   • oxyCODONE (ROXICODONE) 20 MG tablet Take 20 mg by mouth Every 4 (Four) Hours As Needed for Moderate Pain .     • predniSONE (DELTASONE) 10 MG tablet      • simvastatin (Zocor) 20 MG tablet Take 1 tablet by mouth Every Night. 90 tablet 3   • Spacer/Aero-Holding Chambers device Use as directed with the inhalers 1 each 0   • Testosterone 20.25 MG/ACT (1.62%) gel        No current facility-administered medications for this visit.       Review of Systems   Constitutional: Negative for activity change, appetite change, fatigue, fever, unexpected weight gain and unexpected weight loss.   HENT: Negative for nosebleeds, rhinorrhea, trouble swallowing and voice change.    Eyes: Negative for visual disturbance.   Respiratory: Negative for cough, chest tightness, shortness of breath and wheezing.    Cardiovascular: Negative for chest pain, palpitations and leg swelling.   Gastrointestinal: Negative for abdominal pain, blood in stool, constipation, diarrhea, nausea, vomiting, GERD and indigestion.   Genitourinary: Positive for hematuria.        Urinary issues as noted.   Musculoskeletal: Negative for arthralgias, back pain and myalgias.        Leg cramps   Skin: Negative for rash and bruise.   Neurological: Negative for dizziness, tremors, weakness, light-headedness, numbness, headache and memory problem.   Hematological: Negative for adenopathy. Does not  "bruise/bleed easily.   Psychiatric/Behavioral: Negative for sleep disturbance and depressed mood. The patient is not nervous/anxious.        Objective   /76 (BP Location: Right arm, Patient Position: Sitting, Cuff Size: Large Adult)   Pulse 77   Temp 97.9 °F (36.6 °C) (Temporal)   Ht 170.2 cm (67.01\")   SpO2 96%   BMI 45.47 kg/m²     Physical Exam  Vitals and nursing note reviewed.   Constitutional:       General: He is not in acute distress.     Appearance: He is well-developed. He is obese. He is not diaphoretic.   HENT:      Head: Normocephalic and atraumatic.      Right Ear: External ear normal.      Left Ear: External ear normal.      Nose: Nose normal.   Eyes:      Conjunctiva/sclera: Conjunctivae normal.      Pupils: Pupils are equal, round, and reactive to light.   Neck:      Thyroid: No thyromegaly.      Trachea: No tracheal deviation.   Cardiovascular:      Rate and Rhythm: Normal rate and regular rhythm.      Heart sounds: Normal heart sounds. No murmur heard.   No friction rub. No gallop.    Pulmonary:      Effort: Pulmonary effort is normal. No respiratory distress.      Breath sounds: Normal breath sounds.   Abdominal:      General: Bowel sounds are normal.      Palpations: Abdomen is soft. There is no mass.      Tenderness: There is no abdominal tenderness. There is no guarding.   Musculoskeletal:         General: Normal range of motion.      Cervical back: Normal range of motion and neck supple.   Lymphadenopathy:      Cervical: No cervical adenopathy.   Skin:     General: Skin is warm and dry.      Capillary Refill: Capillary refill takes less than 2 seconds.      Findings: No rash.   Neurological:      Mental Status: He is alert and oriented to person, place, and time.      Motor: No abnormal muscle tone.      Deep Tendon Reflexes: Reflexes normal.   Psychiatric:         Behavior: Behavior normal.         Thought Content: Thought content normal.         Judgment: Judgment normal. "         Recent Results (from the past 2016 hour(s))   POCT urinalysis dipstick, automated    Collection Time: 08/26/21 10:59 AM    Specimen: Urine   Result Value Ref Range    Color Yellow Yellow, Straw, Dark Yellow, Mackenzie    Clarity, UA Cloudy (A) Clear    Specific Gravity  1.015 1.005 - 1.030    pH, Urine 6.0 5.0 - 8.0    Leukocytes Negative Negative    Nitrite, UA Negative Negative    Protein, POC 1+ (A) Negative mg/dL    Glucose, UA Negative Negative, 1000 mg/dL (3+) mg/dL    Ketones, UA Negative Negative    Urobilinogen, UA Normal Normal    Bilirubin Negative Negative    Blood, UA 3+ (A) Negative     Assessment/Plan   Diagnoses and all orders for this visit:    1. Urinary problem (Primary)  -     POCT urinalysis dipstick, automated  -     ciprofloxacin (Cipro) 500 MG tablet; Take 1 tablet by mouth 2 (Two) Times a Day.  Dispense: 14 tablet; Refill: 0    2. Acute cystitis with hematuria  -     ciprofloxacin (Cipro) 500 MG tablet; Take 1 tablet by mouth 2 (Two) Times a Day.  Dispense: 14 tablet; Refill: 0    3. Urinary (tract) obstruction  -     ciprofloxacin (Cipro) 500 MG tablet; Take 1 tablet by mouth 2 (Two) Times a Day.  Dispense: 14 tablet; Refill: 0    4. Bilateral leg cramps  -     Comprehensive Metabolic Panel  -     Magnesium    5. Anxiety  -     clonazePAM (KlonoPIN) 1 MG tablet; Take 1 tablet by mouth 2 (Two) Times a Day As Needed for Anxiety.  Dispense: 60 tablet; Refill: 0    6. Hyperlipidemia, unspecified hyperlipidemia type  -     Comprehensive Metabolic Panel  -     Lipid Panel    7. Moderate persistent asthma, unspecified whether complicated  -     albuterol (ACCUNEB) 1.25 MG/3ML nebulizer solution; Take 3 mL by nebulization Every 6 (Six) Hours As Needed for Wheezing.  Dispense: 120 each; Refill: 6    8. Drug-induced constipation  -     linaclotide (Linzess) 145 MCG capsule capsule; Take 1 capsule by mouth Every Morning Before Breakfast.  Dispense: 30 capsule; Refill: 5    Urinary issue consistent  with his recurrent acute cystitis secondary to self cath frequency and obstruction.  We will treat with the Cipro and observe and asked that he follow back up with his urologist.  If he has this persisting issues and this does not resolve he may end up having to have some surgery in the future we will leave this up to urology.  Noted have bilateral leg cramps we will check the electrolytes and magnesium levels.  Anxiety is stable.YU run and reviewed.  Risks of the medication include but are not limited to fatigue, somnolence, increased risk of falls, constipation, allergic reaction, dependence, and addiction.  Patient understands risk of the clonazepam will continue at this time.  Also check lipid panel for history of hyperlipidemia and consider medication based upon his results.  Patient with drug-induced constipation secondary to his chronic pain medicines.  We will try a course of Linzess 145 mg capsules daily.  Recommended again that he have the colonoscopy that has been ordered.         · COVID-19 Precautions - Patient was compliant in wearing a mask. When I saw the patient, I used appropriate personal protective equipment (PPE) including mask and eye shield (standard procedure).  Additionally, I used gown and gloves if indicated.  Hand hygiene was completed before and after seeing the patient.  · Dictated utilizing Dragon Dictation

## 2021-08-27 LAB
ALBUMIN SERPL-MCNC: 4 G/DL (ref 3.8–4.9)
ALBUMIN/GLOB SERPL: 1.5 {RATIO} (ref 1.2–2.2)
ALP SERPL-CCNC: 106 IU/L (ref 48–121)
ALT SERPL-CCNC: 9 IU/L (ref 0–44)
AST SERPL-CCNC: 17 IU/L (ref 0–40)
BILIRUB SERPL-MCNC: 0.3 MG/DL (ref 0–1.2)
BUN SERPL-MCNC: 22 MG/DL (ref 6–24)
BUN/CREAT SERPL: 10 (ref 9–20)
CALCIUM SERPL-MCNC: 9 MG/DL (ref 8.7–10.2)
CHLORIDE SERPL-SCNC: 107 MMOL/L (ref 96–106)
CHOLEST SERPL-MCNC: 190 MG/DL (ref 100–199)
CO2 SERPL-SCNC: 22 MMOL/L (ref 20–29)
CREAT SERPL-MCNC: 2.25 MG/DL (ref 0.76–1.27)
GLOBULIN SER CALC-MCNC: 2.7 G/DL (ref 1.5–4.5)
GLUCOSE SERPL-MCNC: 84 MG/DL (ref 65–99)
HDLC SERPL-MCNC: 39 MG/DL
LDLC SERPL CALC-MCNC: 102 MG/DL (ref 0–99)
MAGNESIUM SERPL-MCNC: 2 MG/DL (ref 1.6–2.3)
POTASSIUM SERPL-SCNC: 4.3 MMOL/L (ref 3.5–5.2)
PROT SERPL-MCNC: 6.7 G/DL (ref 6–8.5)
SODIUM SERPL-SCNC: 141 MMOL/L (ref 134–144)
TRIGL SERPL-MCNC: 291 MG/DL (ref 0–149)
VLDLC SERPL CALC-MCNC: 49 MG/DL (ref 5–40)

## 2021-10-04 DIAGNOSIS — F41.9 ANXIETY: ICD-10-CM

## 2021-10-06 RX ORDER — CLONAZEPAM 1 MG/1
1 TABLET ORAL 2 TIMES DAILY PRN
Qty: 60 TABLET | Refills: 0 | Status: SHIPPED | OUTPATIENT
Start: 2021-10-06 | End: 2021-11-19 | Stop reason: SDUPTHER

## 2021-10-06 NOTE — TELEPHONE ENCOUNTER
Rx Refill Note  Requested Prescriptions     Pending Prescriptions Disp Refills   • clonazePAM (KlonoPIN) 1 MG tablet 60 tablet 0     Sig: Take 1 tablet by mouth 2 (Two) Times a Day As Needed for Anxiety.      Last office visit with prescribing clinician: 8/26/2021      Next office visit with prescribing clinician: Visit date not found            Mary Kapoor MA  10/06/21, 07:55 EDT

## 2021-10-20 ENCOUNTER — OFFICE VISIT (OUTPATIENT)
Dept: FAMILY MEDICINE CLINIC | Facility: CLINIC | Age: 51
End: 2021-10-20

## 2021-10-20 VITALS
HEIGHT: 67 IN | BODY MASS INDEX: 45.47 KG/M2 | DIASTOLIC BLOOD PRESSURE: 82 MMHG | TEMPERATURE: 97.9 F | SYSTOLIC BLOOD PRESSURE: 132 MMHG | HEART RATE: 74 BPM | OXYGEN SATURATION: 97 %

## 2021-10-20 DIAGNOSIS — N13.9 URINARY (TRACT) OBSTRUCTION: Primary | ICD-10-CM

## 2021-10-20 DIAGNOSIS — R33.9 INCOMPLETE EMPTYING OF BLADDER: ICD-10-CM

## 2021-10-20 DIAGNOSIS — Z94.0 H/O KIDNEY TRANSPLANT: ICD-10-CM

## 2021-10-20 PROBLEM — N18.32 STAGE 3B CHRONIC KIDNEY DISEASE (HCC): Status: ACTIVE | Noted: 2021-09-23

## 2021-10-20 PROBLEM — D84.9 IMMUNOSUPPRESSION: Status: ACTIVE | Noted: 2021-09-23

## 2021-10-20 PROCEDURE — 99213 OFFICE O/P EST LOW 20 MIN: CPT | Performed by: INTERNAL MEDICINE

## 2021-11-19 DIAGNOSIS — F41.9 ANXIETY: ICD-10-CM

## 2021-11-20 RX ORDER — CLONAZEPAM 1 MG/1
1 TABLET ORAL 2 TIMES DAILY PRN
Qty: 60 TABLET | Refills: 0 | Status: SHIPPED | OUTPATIENT
Start: 2021-11-20 | End: 2021-12-28 | Stop reason: SDUPTHER

## 2021-12-28 DIAGNOSIS — F41.9 ANXIETY: ICD-10-CM

## 2021-12-29 RX ORDER — CLONAZEPAM 1 MG/1
1 TABLET ORAL 2 TIMES DAILY PRN
Qty: 60 TABLET | Refills: 0 | Status: SHIPPED | OUTPATIENT
Start: 2021-12-29 | End: 2022-01-31 | Stop reason: SDUPTHER

## 2021-12-29 NOTE — TELEPHONE ENCOUNTER
Rx Refill Note  Requested Prescriptions     Pending Prescriptions Disp Refills   • clonazePAM (KlonoPIN) 1 MG tablet 60 tablet 0     Sig: Take 1 tablet by mouth 2 (Two) Times a Day As Needed for Anxiety.      Last office visit with prescribing clinician: 10/20/2021  Next office visit with prescribing clinician: 1/31/22           Jean-Paul Moran MA  12/29/21, 08:06 EST/

## 2022-01-31 ENCOUNTER — OFFICE VISIT (OUTPATIENT)
Dept: FAMILY MEDICINE CLINIC | Facility: CLINIC | Age: 52
End: 2022-01-31

## 2022-01-31 VITALS
HEIGHT: 67 IN | OXYGEN SATURATION: 98 % | TEMPERATURE: 97.6 F | BODY MASS INDEX: 45.47 KG/M2 | SYSTOLIC BLOOD PRESSURE: 142 MMHG | HEART RATE: 78 BPM | DIASTOLIC BLOOD PRESSURE: 88 MMHG

## 2022-01-31 DIAGNOSIS — Z00.00 MEDICARE ANNUAL WELLNESS VISIT, SUBSEQUENT: Primary | ICD-10-CM

## 2022-01-31 DIAGNOSIS — Z28.21 COVID-19 VACCINATION REFUSED: ICD-10-CM

## 2022-01-31 DIAGNOSIS — Z23 IMMUNIZATION DUE: ICD-10-CM

## 2022-01-31 DIAGNOSIS — F41.9 ANXIETY: ICD-10-CM

## 2022-01-31 DIAGNOSIS — Z12.11 SCREENING FOR COLON CANCER: ICD-10-CM

## 2022-01-31 DIAGNOSIS — I10 BENIGN ESSENTIAL HYPERTENSION: ICD-10-CM

## 2022-01-31 PROCEDURE — 90670 PCV13 VACCINE IM: CPT | Performed by: INTERNAL MEDICINE

## 2022-01-31 PROCEDURE — G0009 ADMIN PNEUMOCOCCAL VACCINE: HCPCS | Performed by: INTERNAL MEDICINE

## 2022-01-31 PROCEDURE — G0439 PPPS, SUBSEQ VISIT: HCPCS | Performed by: INTERNAL MEDICINE

## 2022-01-31 PROCEDURE — 96160 PT-FOCUSED HLTH RISK ASSMT: CPT | Performed by: INTERNAL MEDICINE

## 2022-01-31 PROCEDURE — G0008 ADMIN INFLUENZA VIRUS VAC: HCPCS | Performed by: INTERNAL MEDICINE

## 2022-01-31 PROCEDURE — 1159F MED LIST DOCD IN RCRD: CPT | Performed by: INTERNAL MEDICINE

## 2022-01-31 PROCEDURE — 90686 IIV4 VACC NO PRSV 0.5 ML IM: CPT | Performed by: INTERNAL MEDICINE

## 2022-01-31 PROCEDURE — 1170F FXNL STATUS ASSESSED: CPT | Performed by: INTERNAL MEDICINE

## 2022-01-31 RX ORDER — CLONAZEPAM 1 MG/1
1 TABLET ORAL 2 TIMES DAILY PRN
Qty: 60 TABLET | Refills: 1 | Status: SHIPPED | OUTPATIENT
Start: 2022-01-31 | End: 2022-04-04 | Stop reason: SDUPTHER

## 2022-01-31 NOTE — PATIENT INSTRUCTIONS
Medicare Wellness  Personal Prevention Plan of Service     Date of Office Visit:  2022  Encounter Provider:  Guanako Coleman MD  Place of Service:  [facility]  Patient Name: Misael Luna  :  1970    As part of the Medicare Wellness portion of your visit today, we are providing you with this personalized preventive plan of services (PPPS). This plan is based upon recommendations of the [place] Preventive Services Task Force (USPSTF) and the Advisory Committee on Immunization Practices (ACIP).    This lists the preventive care services that should be considered, and provides dates of when you are due. Items listed as completed are up-to-date and do not require any further intervention.    Health Maintenance   Topic Date Due   • COLORECTAL CANCER SCREENING  Never done   • HEPATITIS C SCREENING  Never done   • ZOSTER VACCINE (1 of 2) Never done   • COVID-19 Vaccine (1) 2022 (Originally 3/27/1982)   • LIPID PANEL  2022   • ANNUAL WELLNESS VISIT  2023   • DXA SCAN  06/10/2023   • Pneumococcal Vaccine 0-64 (3 of 4 - PPSV23) 2025   • TDAP/TD VACCINES (2 - Td or Tdap) 2028   • INFLUENZA VACCINE  Completed       Orders Placed This Encounter   Procedures   • FluLaval/Fluarix/Fluzone >6 Months (8710-7154)   • Pneumococcal Conjugate Vaccine 13-Valent All (PCV13)   • Ambulatory Referral For Screening Colonoscopy     Referral Priority:   Routine     Referral Type:   Diagnostic Medical     Referral Reason:   Specialty Services Required     Number of Visits Requested:   1       Return in about 6 months (around 2022).

## 2022-01-31 NOTE — PROGRESS NOTES
Subsequent Medicare Wellness Visit   The ABC's of the Annual Wellness Visit    Chief Complaint   Patient presents with   • Annual Exam       HPI:  Misael Luna, -1970, is a 51 y.o. male who presents for a Subsequent Medicare Wellness Visit.    Patient here for follow-up with chronic urinary retention and abdominal pain still requiring the been laying down when he has self catheterizations every 2 hours. Continues to be followed by urology and [facility] renal transplant team. No new changes in medications. Just finished another course of his antibiotics because of urinary tract infections that are recurrent.     Follow-up for cholesterol.  Currently, has been feeling well without any myalgias, muscle aches, weakness, numbness, chest pain, short of breath or other issues.  Currently, is adherent with medication regimen of simvastatin 20 mg daily and denies medication side effects.  Last lipid panel performed on 2021.    Recent Hospitalizations:  No hospitalization(s) within the last year..    Current Medical Providers:  Patient Care Team:  Guanako Coleman MD as PCP - General (Internal Medicine)    Health Habits and Functional and Cognitive Screening and Depression Screening:  Functional & Cognitive Status 2022   Do you have difficulty preparing food and eating? No   Do you have difficulty bathing yourself, getting dressed or grooming yourself? No   Do you have difficulty using the toilet? No   Do you have difficulty moving around from place to place? No   Do you have trouble with steps or getting out of a bed or a chair? No   Current Diet Well Balanced Diet   Dental Exam Up to date   Eye Exam Up to date   Exercise (times per week) 0 times per week   Do you need help using the phone?  No   Are you deaf or do you have serious difficulty hearing?  No   Do you need help with transportation? No   Do you need help shopping? No   Do you need help preparing meals?  No   Do you need help with housework?  No    Do you need help with laundry? No   Do you need help taking your medications? No   Do you need help managing money? No   Do you ever drive or ride in a car without wearing a seat belt? No   Have you felt unusual stress, anger or loneliness in the last month? No   Who do you live with? Spouse   If you need help, do you have trouble finding someone available to you? No   Have you been bothered in the last four weeks by sexual problems? No   Do you have difficulty concentrating, remembering or making decisions? No       Compared to one year ago, the patient feels his physical health is the same and his mental health is the same.    Depression Screen:  PHQ-2/PHQ-9 Depression Screening 1/31/2022   Little interest or pleasure in doing things 0   Feeling down, depressed, or hopeless 0   Trouble falling or staying asleep, or sleeping too much 3   Feeling tired or having little energy 3   Poor appetite or overeating 0   Feeling bad about yourself - or that you are a failure or have let yourself or your family down 0   Trouble concentrating on things, such as reading the newspaper or watching television 0   Moving or speaking so slowly that other people could have noticed. Or the opposite - being so fidgety or restless that you have been moving around a lot more than usual 0   Thoughts that you would be better off dead, or of hurting yourself in some way 0   Total Score 6     Patient screened positive for depression based on a PHQ-9 score of 6 on 1/31/2022. Follow-up recommendations include: Elevated PHQ-9 secondary to chronic medical condition causing fatigue and chronic insomnia..       Falls Risk Assessment:  YOKASTA Fall Risk Clinician Key Questions   Have you fallen in the past year?: Yes      Past Medical/Family/Social History:  The following portions of the patient's history were reviewed and updated as appropriate: allergies, current medications, past family history, past medical history, past social history, past  "surgical history and problem list.    No Known Allergies      Current Outpatient Medications:   •  albuterol (ACCUNEB) 1.25 MG/3ML nebulizer solution, Take 3 mL by nebulization Every 6 (Six) Hours As Needed for Wheezing., Disp: 120 each, Rfl: 6  •  albuterol sulfate  (90 Base) MCG/ACT inhaler, Inhale 2 puffs Every 4 (Four) Hours As Needed for Wheezing., Disp: 18 g, Rfl: 6  •  azaTHIOprine (IMURAN) 50 MG tablet, 150 mg., Disp: , Rfl:   •  B-D 3CC LUER-DOMINGUEZ SYR 22GX1\" 22G X 1\" 3 ML misc, , Disp: , Rfl:   •  B-D 3CC LUER-DOMINGUEZ SYR 23GX1\" 23G X 1\" 3 ML misc, , Disp: , Rfl:   •  B-D HYPODERMIC NEEDLE 18GX1.5\" 18G X 1-1/2\" misc, USE Q  2 WEEKS, Disp: , Rfl: 3  •  BD HYPODERMIC NEEDLE 18G X 1\" misc, , Disp: , Rfl:   •  clonazePAM (KlonoPIN) 1 MG tablet, Take 1 tablet by mouth 2 (Two) Times a Day As Needed for Anxiety., Disp: 60 tablet, Rfl: 1  •  colchicine (Colcrys) 0.6 MG tablet, Take 2 tabs initially then may take 1 tab 1 hour later., Disp: 6 tablet, Rfl: 0  •  diclofenac (VOLTAREN) 1 % gel gel, Apply 4 g topically to the appropriate area as directed 4 (Four) Times a Day As Needed (ankle/joint pain)., Disp: 100 g, Rfl: 1  •  furosemide (LASIX) 20 MG tablet, , Disp: , Rfl:   •  gabapentin (NEURONTIN) 100 MG capsule, Take 100 mg by mouth 3 (Three) Times a Day., Disp: , Rfl: 2  •  lidocaine (LIDODERM) 5 %, Place 1 patch on the skin as directed by provider Daily. Remove & Discard patch within 12 hours or as directed by MD, Disp: 15 patch, Rfl: 3  •  linaclotide (Linzess) 145 MCG capsule capsule, Take 1 capsule by mouth Every Morning Before Breakfast., Disp: 30 capsule, Rfl: 5  •  omeprazole (priLOSEC) 40 MG capsule, Take 1 capsule by mouth Daily., Disp: 90 capsule, Rfl: 1  •  oxyCODONE (ROXICODONE) 20 MG tablet, Take 20 mg by mouth Every 4 (Four) Hours As Needed for Moderate Pain ., Disp: , Rfl:   •  predniSONE (DELTASONE) 10 MG tablet, , Disp: , Rfl:   •  simvastatin (Zocor) 20 MG tablet, Take 1 tablet by mouth Every " Night., Disp: 90 tablet, Rfl: 3  •  Spacer/Aero-Holding Chambers device, Use as directed with the inhalers, Disp: 1 each, Rfl: 0  •  Testosterone 20.25 MG/ACT (1.62%) gel, , Disp: , Rfl:   •  amoxicillin-clavulanate (AUGMENTIN) 875-125 MG per tablet, , Disp: , Rfl:   •  ciprofloxacin (Cipro) 500 MG tablet, Take 1 tablet by mouth 2 (Two) Times a Day., Disp: 14 tablet, Rfl: 0  No current facility-administered medications for this visit.    Aspirin use counseling: Does not need ASA (and currently is not on it)    Current medication list contains no high risk medications.  No harmful drug interactions have been identified.     Family History   Problem Relation Age of Onset   • Arthritis Mother    • Asthma Mother    • Arthritis Father    • Asthma Father    • Heart disease Father    • COPD Father    • Hyperlipidemia Father    • Arthritis Maternal Grandmother    • Osteoporosis Maternal Grandfather    • Arthritis Maternal Grandfather    • No Known Problems Other        Social History     Tobacco Use   • Smoking status: Never Smoker   • Smokeless tobacco: Never Used   Substance Use Topics   • Alcohol use: No       Past Surgical History:   Procedure Laterality Date   • BLADDER SURGERY      bladder augmentation   • NEPHRECTOMY     • TRANSPLANTATION RENAL  1991       Patient Active Problem List   Diagnosis   • Asthma   • BPV (benign positional vertigo)   • Depression   • Anxiety   • Generalized osteoarthritis of multiple sites   • GERD (gastroesophageal reflux disease)   • H/O kidney transplant   • Hyperlipidemia   • Incomplete emptying of bladder   • Osteopenia   • Obesity, morbid, BMI 40.0-49.9 ([facility])   • Palpitations   • Panic attacks   • Sleep paralysis   • Plantar fasciitis   • Traumatic hematuria   • Urinary (tract) obstruction   • Current chronic use of systemic steroids   • Testosterone deficiency in male   • Urinary tract infection   • Acute right ankle pain   • Left shoulder pain   • Left arm pain   • Left arm numbness  "  • Left arm weakness   • Radicular pain in left arm   • Weakness of left hand   • Partial tear of left subscapularis tendon   • Tear of left supraspinatus tendon   • Left foot pain   • Long term systemic steroid user   • Right hand pain   • Suprapubic tenderness   • Colon cancer screening   • Bilateral leg cramps   • Constipation   • Stage 3b chronic kidney disease (HCC)   • Immunosuppression (HCC)   • Benign essential hypertension   • Medicare annual wellness visit, subsequent   • Immunization due   • COVID-19 vaccination refused       Review of Systems   Constitutional: Negative for activity change, appetite change, fatigue, fever, unexpected weight gain and unexpected weight loss.   HENT: Negative for nosebleeds, rhinorrhea, trouble swallowing and voice change.    Eyes: Negative for visual disturbance.   Respiratory: Negative for cough, chest tightness, shortness of breath and wheezing.    Cardiovascular: Negative for chest pain, palpitations and leg swelling.   Gastrointestinal: Negative for abdominal pain, blood in stool, constipation, diarrhea, nausea, vomiting, GERD and indigestion.   Genitourinary: Negative for dysuria, frequency and hematuria.        Self cath   Musculoskeletal: Negative for arthralgias, back pain and myalgias.   Skin: Negative for rash and wound.   Neurological: Negative for dizziness, tremors, weakness, light-headedness, numbness, headache and memory problem.   Hematological: Negative for adenopathy. Does not bruise/bleed easily.   Psychiatric/Behavioral: Negative for sleep disturbance and depressed mood. The patient is not nervous/anxious.        Objective     Vitals:    01/31/22 1423   BP: 142/88   BP Location: Left arm   Patient Position: Sitting   Cuff Size: Large Adult   Pulse: 78   Temp: 97.6 °F (36.4 °C)   TempSrc: Temporal   SpO2: 98%   Weight: Comment: patient refused   Height: 170.2 cm (67.01\")       Patient refused weight.  Unable to assess BMI.      No exam data " present    The patient has no evidence of cognitve impairment.     Physical Exam  Vitals and nursing note reviewed.   Constitutional:       General: He is not in acute distress.     Appearance: He is well-developed. He is obese. He is not diaphoretic.   HENT:      Head: Normocephalic and atraumatic.      Right Ear: External ear normal.      Left Ear: External ear normal.      Nose: Nose normal.   Eyes:      Conjunctiva/sclera: Conjunctivae normal.      Pupils: Pupils are equal, round, and reactive to light.   Neck:      Thyroid: No thyromegaly.      Trachea: No tracheal deviation.   Cardiovascular:      Rate and Rhythm: Normal rate and regular rhythm.      Heart sounds: Normal heart sounds. No murmur heard.  No friction rub. No gallop.    Pulmonary:      Effort: Pulmonary effort is normal. No respiratory distress.      Breath sounds: Normal breath sounds.   Abdominal:      General: Bowel sounds are normal.      Palpations: Abdomen is soft. There is no mass.      Tenderness: There is no abdominal tenderness. There is no guarding.   Musculoskeletal:         General: Normal range of motion.      Cervical back: Normal range of motion and neck supple.   Lymphadenopathy:      Cervical: No cervical adenopathy.   Skin:     General: Skin is warm and dry.      Capillary Refill: Capillary refill takes less than 2 seconds.      Findings: No rash.      Comments: Multiple skin tags and actinic keratosis on face and neck.   Neurological:      Mental Status: He is alert and oriented to person, place, and time.      Motor: No abnormal muscle tone.      Deep Tendon Reflexes: Reflexes normal.   Psychiatric:         Behavior: Behavior normal.         Thought Content: Thought content normal.         Judgment: Judgment normal.         Recent Lab Results:     Lab Results   Component Value Date    TRIG 291 (H) 08/26/2021    HDL 39 (L) 08/26/2021    VLDL 49 (H) 08/26/2021       Assessment/Plan   Age-appropriate Screening Schedule:  Refer to  the list below for future screening recommendations based on patient's age, sex and/or medical conditions.      Health Maintenance   Topic Date Due   • ZOSTER VACCINE (1 of 2) Never done   • LIPID PANEL  08/26/2022   • DXA SCAN  06/10/2023   • TDAP/TD VACCINES (2 - Td or Tdap) 08/04/2028   • INFLUENZA VACCINE  Completed       Medicare Risks and Personalized Health Plan:  Advance Directive Discussion  Fall Risk  Immunizations Discussed/Encouraged (specific immunizations; Influenza, Shingrix and Prevnar 13 )  Obesity/Overweight       CMS-Preventive Services Quick Reference  Medicare Preventive Services Addressed:  Alcohol Misuse Screening and Counseling  (15 minutes counseling time, Code )  Glaucoma screening (for individuals with diabetes mellitus, family history of glaucoma, -Americans (> or =) age 50, -Americans (> or =) age 65)    Advance Care Planning:  ACP discussion was held with the patient during this visit. Patient does not have an advance directive, information provided.    Diagnoses and all orders for this visit:    1. Medicare annual wellness visit, subsequent (Primary)    2. Benign essential hypertension    3. Immunization due  -     FluLaval/Fluarix/Fluzone >6 Months (9159-7385)  -     Discontinue: pneumococcal conj. 13-valent (PREVNAR-13) vaccine 0.5 mL  -     Pneumococcal Conjugate Vaccine 13-Valent All (PCV13)    4. COVID-19 vaccination refused    5. Screening for colon cancer  -     Ambulatory Referral For Screening Colonoscopy    6. Anxiety  -     clonazePAM (KlonoPIN) 1 MG tablet; Take 1 tablet by mouth 2 (Two) Times a Day As Needed for Anxiety.  Dispense: 60 tablet; Refill: 1      Annual wellness visit reviewed with patient.  All past history, medications, social history, and problem list were reviewed.  Discussed advanced directives and living will.  Patient has living will: Living will: no and information packet given to patient to complete at home and bring copy to office.   Discussed fall risk and precautions encourage removing throw rugs and using grab bars within the home and bathroom.  Labs reviewed in the chart.  Discussed flu shot recommended to get the high-dose influenza vaccine annually in the fall.    Pneumovax-23 up to date and appropriate.  Prevnar 13, COVID-19 vaccination series, and Shingrix vaccination series recommended.  Encourage follow-up with the eye doctor on annual basis for glaucoma evaluation.  Discussed weight and encouraged exercise as tolerated while following a healthy diet.  Colon cancer screening discussed and current status: colonoscopy ordered again.  Discussed prostate screening for which last PSA was done on 8/12/2021 and normal.  Follow up with current specialists as needed.    Flu shot and [id number] immunization today in office.  Once again we will reorder the colonoscopy.  Continue his current medications.YU run and reviewed.  Risks of the medication include but are not limited to fatigue, somnolence, increased risk of falls, allergic reaction, dependence, and addiction.    An After Visit Summary and PPPS with all of these plans were given to the patient.      Follow Up:  No follow-ups on file.           · COVID-19 Precautions - Patient was compliant in wearing a mask. When I saw the patient, I used appropriate personal protective equipment (PPE) including mask and eye shield (standard procedure).  Additionally, I used gown and gloves if indicated.  Hand hygiene was completed before and after seeing the patient.  · Dictated utilizing Dragon Dictation                                        home

## 2022-02-07 ENCOUNTER — PRE-PROCEDURE SCREENING (OUTPATIENT)
Dept: GASTROENTEROLOGY | Facility: CLINIC | Age: 52
End: 2022-02-07

## 2022-03-24 DIAGNOSIS — K21.9 GASTROESOPHAGEAL REFLUX DISEASE WITHOUT ESOPHAGITIS: ICD-10-CM

## 2022-03-24 RX ORDER — OMEPRAZOLE 40 MG/1
40 CAPSULE, DELAYED RELEASE ORAL DAILY
Qty: 90 CAPSULE | Refills: 1 | Status: SHIPPED | OUTPATIENT
Start: 2022-03-24 | End: 2022-05-09

## 2022-03-24 NOTE — TELEPHONE ENCOUNTER
Rx Refill Note  Requested Prescriptions     Pending Prescriptions Disp Refills   • omeprazole (priLOSEC) 40 MG capsule [Pharmacy Med Name: OMEPRAZOLE 40MG CAPSULES] 90 capsule 1     Sig: TAKE 1 CAPSULE BY MOUTH DAILY      Last office visit with prescribing clinician: 1/31/2022      Next office visit with prescribing clinician: Visit date not found            Mary Kapoor MA  03/24/22, 16:19 EDT

## 2022-04-04 DIAGNOSIS — F41.9 ANXIETY: ICD-10-CM

## 2022-04-05 RX ORDER — CLONAZEPAM 1 MG/1
1 TABLET ORAL 2 TIMES DAILY PRN
Qty: 60 TABLET | Refills: 1 | Status: SHIPPED | OUTPATIENT
Start: 2022-04-05 | End: 2022-05-09 | Stop reason: SDUPTHER

## 2022-05-09 ENCOUNTER — OFFICE VISIT (OUTPATIENT)
Dept: FAMILY MEDICINE CLINIC | Facility: CLINIC | Age: 52
End: 2022-05-09

## 2022-05-09 VITALS
BODY MASS INDEX: 45.47 KG/M2 | TEMPERATURE: 97.9 F | OXYGEN SATURATION: 95 % | HEIGHT: 67 IN | DIASTOLIC BLOOD PRESSURE: 80 MMHG | SYSTOLIC BLOOD PRESSURE: 124 MMHG | HEART RATE: 88 BPM

## 2022-05-09 DIAGNOSIS — J30.2 SEASONAL ALLERGIES: Primary | ICD-10-CM

## 2022-05-09 DIAGNOSIS — F41.9 ANXIETY: ICD-10-CM

## 2022-05-09 DIAGNOSIS — R13.10 DYSPHAGIA, UNSPECIFIED TYPE: ICD-10-CM

## 2022-05-09 DIAGNOSIS — J45.40 MODERATE PERSISTENT ASTHMA, UNSPECIFIED WHETHER COMPLICATED: ICD-10-CM

## 2022-05-09 DIAGNOSIS — K21.9 GASTROESOPHAGEAL REFLUX DISEASE WITHOUT ESOPHAGITIS: ICD-10-CM

## 2022-05-09 PROCEDURE — 99214 OFFICE O/P EST MOD 30 MIN: CPT | Performed by: INTERNAL MEDICINE

## 2022-05-09 RX ORDER — LORATADINE 10 MG/1
10 TABLET ORAL DAILY
Qty: 30 TABLET | Refills: 6 | Status: SHIPPED | OUTPATIENT
Start: 2022-05-09

## 2022-05-09 RX ORDER — ALBUTEROL SULFATE 1.25 MG/3ML
1 SOLUTION RESPIRATORY (INHALATION) EVERY 6 HOURS PRN
Qty: 120 EACH | Refills: 6 | Status: SHIPPED | OUTPATIENT
Start: 2022-05-09

## 2022-05-09 RX ORDER — FLUTICASONE PROPIONATE 50 MCG
2 SPRAY, SUSPENSION (ML) NASAL DAILY
Qty: 16 G | Refills: 5 | Status: SHIPPED | OUTPATIENT
Start: 2022-05-09 | End: 2022-11-17

## 2022-05-09 RX ORDER — CLONAZEPAM 1 MG/1
1 TABLET ORAL 2 TIMES DAILY PRN
Qty: 60 TABLET | Refills: 1 | Status: SHIPPED | OUTPATIENT
Start: 2022-05-09 | End: 2022-07-11 | Stop reason: SDUPTHER

## 2022-05-09 RX ORDER — PANTOPRAZOLE SODIUM 40 MG/1
40 TABLET, DELAYED RELEASE ORAL DAILY
Qty: 30 TABLET | Refills: 3 | Status: SHIPPED | OUTPATIENT
Start: 2022-05-09 | End: 2022-11-17 | Stop reason: SDUPTHER

## 2022-05-09 NOTE — PROGRESS NOTES
Subjective   Misael Luna is a 52 y.o. male.     Chief Complaint   Patient presents with   • Allergies       History of Present Illness   Having significant allergy issues with sneezing, cough, watery eyes, swelling around eyes.    Having issues with swallowing foods even mashed potatoes that stick with swallowing.  This has been present for a while but worsening.    History of chronic urinary retention and abdominal pain still requiring the been laying down when he has self catheterizations every 2 hours. Continues to be followed by urology and [facility] renal transplant team. No new changes in medications. Just finished another course of his antibiotics because of urinary tract infections that are recurrent.      History of high cholesterol.  Currently, has been feeling well without any myalgias, muscle aches, weakness, numbness, chest pain, short of breath or other issues.  Currently, is adherent with medication regimen of simvastatin 20 mg daily and denies medication side effects.  Last lipid panel performed on 8/26/2021.    The following portions of the patient's history were reviewed and updated as appropriate: allergies, current medications, past family history, past medical history, past social history, past surgical history and problem list.    Depression Screen:  PHQ-2/PHQ-9 Depression Screening 1/31/2022   Retired PHQ-9 Total Score 6   Retired Total Score 6       Past Medical History:   Diagnosis Date   • Asthma    • Disorder of skin    • Generalized osteoarthritis    • Hematuria    • High risk sexual behavior    • History of closed fracture     Lower end of forearm, unspecified laterality    • History of kidney transplant     Kidney replaced by transplant   • Hypertension    • Incomplete emptying of bladder    • Obesity    • Penile lesion    • Tinea cruris    • Urinary obstruction    • Urinary tract infection        Past Surgical History:   Procedure Laterality Date   • BLADDER SURGERY      bladder  "augmentation   • NEPHRECTOMY     • TRANSPLANTATION RENAL  1991       Family History   Problem Relation Age of Onset   • Arthritis Mother    • Asthma Mother    • Arthritis Father    • Asthma Father    • Heart disease Father    • COPD Father    • Hyperlipidemia Father    • Arthritis Maternal Grandmother    • Osteoporosis Maternal Grandfather    • Arthritis Maternal Grandfather    • No Known Problems Other        Social History     Socioeconomic History   • Marital status:    Tobacco Use   • Smoking status: Never Smoker   • Smokeless tobacco: Never Used   Substance and Sexual Activity   • Alcohol use: No   • Drug use: No       Current Outpatient Medications   Medication Sig Dispense Refill   • albuterol (ACCUNEB) 1.25 MG/3ML nebulizer solution Take 3 mL by nebulization Every 6 (Six) Hours As Needed for Wheezing. 120 each 6   • albuterol sulfate  (90 Base) MCG/ACT inhaler Inhale 2 puffs Every 4 (Four) Hours As Needed for Wheezing. 18 g 6   • azaTHIOprine (IMURAN) 50 MG tablet 150 mg.     • B-D 3CC LUER-DOMINGUEZ SYR 22GX1\" 22G X 1\" 3 ML misc      • B-D 3CC LUER-DOMINGUEZ SYR 23GX1\" 23G X 1\" 3 ML misc      • B-D HYPODERMIC NEEDLE 18GX1.5\" 18G X 1-1/2\" misc USE Q  2 WEEKS  3   • BD HYPODERMIC NEEDLE 18G X 1\" misc      • ciprofloxacin (Cipro) 500 MG tablet Take 1 tablet by mouth 2 (Two) Times a Day. 14 tablet 0   • clonazePAM (KlonoPIN) 1 MG tablet Take 1 tablet by mouth 2 (Two) Times a Day As Needed for Anxiety. 60 tablet 1   • colchicine (Colcrys) 0.6 MG tablet Take 2 tabs initially then may take 1 tab 1 hour later. 6 tablet 0   • diclofenac (VOLTAREN) 1 % gel gel Apply 4 g topically to the appropriate area as directed 4 (Four) Times a Day As Needed (ankle/joint pain). 100 g 1   • furosemide (LASIX) 20 MG tablet      • gabapentin (NEURONTIN) 100 MG capsule Take 100 mg by mouth 3 (Three) Times a Day.  2   • lidocaine (LIDODERM) 5 % Place 1 patch on the skin as directed by provider Daily. Remove & Discard patch within 12 " hours or as directed by MD 15 patch 3   • linaclotide (Linzess) 145 MCG capsule capsule Take 1 capsule by mouth Every Morning Before Breakfast. 30 capsule 5   • oxyCODONE (ROXICODONE) 20 MG tablet Take 20 mg by mouth Every 4 (Four) Hours As Needed for Moderate Pain .     • predniSONE (DELTASONE) 10 MG tablet      • simvastatin (Zocor) 20 MG tablet Take 1 tablet by mouth Every Night. 90 tablet 3   • Spacer/Aero-Holding Chambers device Use as directed with the inhalers 1 each 0   • Testosterone 20.25 MG/ACT (1.62%) gel      • fluticasone (Flonase) 50 MCG/ACT nasal spray 2 sprays into the nostril(s) as directed by provider Daily. 16 g 5   • loratadine (Claritin) 10 MG tablet Take 1 tablet by mouth Daily. 30 tablet 6   • pantoprazole (PROTONIX) 40 MG EC tablet Take 1 tablet by mouth Daily. 30 tablet 3     No current facility-administered medications for this visit.       Review of Systems   Constitutional: Negative for activity change, appetite change, fatigue, fever, unexpected weight gain and unexpected weight loss.   HENT: Positive for congestion and rhinorrhea. Negative for nosebleeds, trouble swallowing and voice change.    Eyes: Negative for visual disturbance.   Respiratory: Positive for choking and wheezing. Negative for cough, chest tightness and shortness of breath.    Cardiovascular: Negative for chest pain, palpitations and leg swelling.   Gastrointestinal: Positive for GERD. Negative for abdominal pain, blood in stool, constipation, diarrhea, nausea, vomiting and indigestion.        Dysphagia   Genitourinary: Negative for dysuria, frequency and hematuria.   Musculoskeletal: Negative for arthralgias, back pain and myalgias.   Skin: Negative for rash and wound.   Neurological: Negative for dizziness, tremors, weakness, light-headedness, numbness, headache and memory problem.   Hematological: Negative for adenopathy. Does not bruise/bleed easily.   Psychiatric/Behavioral: Negative for sleep disturbance and  "depressed mood. The patient is not nervous/anxious.        Objective   /80 (BP Location: Right arm, Patient Position: Sitting, Cuff Size: Large Adult)   Pulse 88   Temp 97.9 °F (36.6 °C) (Temporal)   Ht 170.2 cm (67.01\")   SpO2 95%   BMI 45.47 kg/m²     Physical Exam  Vitals and nursing note reviewed.   Constitutional:       General: He is not in acute distress.     Appearance: He is well-developed. He is obese. He is not diaphoretic.   HENT:      Head: Normocephalic and atraumatic.      Right Ear: External ear normal.      Left Ear: External ear normal.      Nose: Nose normal.   Eyes:      Conjunctiva/sclera: Conjunctivae normal.      Pupils: Pupils are equal, round, and reactive to light.   Neck:      Thyroid: No thyromegaly.      Trachea: No tracheal deviation.   Cardiovascular:      Rate and Rhythm: Normal rate and regular rhythm.      Heart sounds: Normal heart sounds. No murmur heard.    No friction rub. No gallop.   Pulmonary:      Effort: Pulmonary effort is normal. No respiratory distress.      Breath sounds: Normal breath sounds.   Abdominal:      General: Bowel sounds are normal.      Palpations: Abdomen is soft. There is no mass.      Tenderness: There is no abdominal tenderness. There is no guarding.   Musculoskeletal:         General: Normal range of motion.      Cervical back: Normal range of motion and neck supple.   Lymphadenopathy:      Cervical: No cervical adenopathy.   Skin:     General: Skin is warm and dry.      Capillary Refill: Capillary refill takes less than 2 seconds.      Findings: No rash.   Neurological:      Mental Status: He is alert and oriented to person, place, and time.      Motor: No abnormal muscle tone.      Deep Tendon Reflexes: Reflexes normal.   Psychiatric:         Behavior: Behavior normal.         Thought Content: Thought content normal.         Judgment: Judgment normal.         No results found for this or any previous visit (from the past 2016 " hour(s)).  Assessment/Plan   Diagnoses and all orders for this visit:    1. Seasonal allergies (Primary)    2. Gastroesophageal reflux disease without esophagitis  -     Ambulatory Referral to Gastroenterology    3. Dysphagia, unspecified type  -     Ambulatory Referral to Gastroenterology    4. Moderate persistent asthma, unspecified whether complicated  -     albuterol (ACCUNEB) 1.25 MG/3ML nebulizer solution; Take 3 mL by nebulization Every 6 (Six) Hours As Needed for Wheezing.  Dispense: 120 each; Refill: 6    5. Anxiety  -     clonazePAM (KlonoPIN) 1 MG tablet; Take 1 tablet by mouth 2 (Two) Times a Day As Needed for Anxiety.  Dispense: 60 tablet; Refill: 1    Other orders  -     pantoprazole (PROTONIX) 40 MG EC tablet; Take 1 tablet by mouth Daily.  Dispense: 30 tablet; Refill: 3  -     fluticasone (Flonase) 50 MCG/ACT nasal spray; 2 sprays into the nostril(s) as directed by provider Daily.  Dispense: 16 g; Refill: 5  -     loratadine (Claritin) 10 MG tablet; Take 1 tablet by mouth Daily.  Dispense: 30 tablet; Refill: 6    Significant seasonal allergies.  I discussed with him treatment options and we would be recommending that he use an antihistamine such as Claritin, Zyrtec, Xyzal, Allegra.  Patient wishes to try the Claritin today.  We will also recommend use of Flonase as needed.    Patient referred to GI for dysphagia and GERD despite omeprazole.  Will likely need EGD and possible dilation.    Anxiety is currently stable utilizing clonazepam as needed.  YU run and reviewed.  Risks of the medication include but are not limited to fatigue, somnolence, increased risk of falls, allergic reaction, dependence, and addiction.       · COVID-19 Precautions - Patient was compliant in wearing a mask. When I saw the patient, I used appropriate personal protective equipment (PPE) including mask and eye shield (standard procedure).  Additionally, I used gown and gloves if indicated.  Hand hygiene was completed  before and after seeing the patient.  · Dictated utilizing Dragon Dictation

## 2022-07-11 DIAGNOSIS — F41.9 ANXIETY: ICD-10-CM

## 2022-07-11 RX ORDER — CLONAZEPAM 1 MG/1
1 TABLET ORAL 2 TIMES DAILY PRN
Qty: 60 TABLET | Refills: 1 | Status: SHIPPED | OUTPATIENT
Start: 2022-07-11 | End: 2022-09-08 | Stop reason: SDUPTHER

## 2022-08-11 ENCOUNTER — OFFICE VISIT (OUTPATIENT)
Dept: FAMILY MEDICINE CLINIC | Facility: CLINIC | Age: 52
End: 2022-08-11

## 2022-08-11 VITALS
TEMPERATURE: 98 F | DIASTOLIC BLOOD PRESSURE: 82 MMHG | SYSTOLIC BLOOD PRESSURE: 138 MMHG | HEART RATE: 78 BPM | BODY MASS INDEX: 45.47 KG/M2 | HEIGHT: 67 IN | OXYGEN SATURATION: 98 %

## 2022-08-11 DIAGNOSIS — R39.9 URINARY SYMPTOM OR SIGN: Primary | ICD-10-CM

## 2022-08-11 DIAGNOSIS — Z79.899 HIGH RISK MEDICATION USE: ICD-10-CM

## 2022-08-11 DIAGNOSIS — R31.9 TRAUMATIC HEMATURIA: ICD-10-CM

## 2022-08-11 DIAGNOSIS — E78.5 HYPERLIPIDEMIA, UNSPECIFIED HYPERLIPIDEMIA TYPE: ICD-10-CM

## 2022-08-11 DIAGNOSIS — I10 BENIGN ESSENTIAL HYPERTENSION: ICD-10-CM

## 2022-08-11 LAB
BILIRUB BLD-MCNC: NEGATIVE MG/DL
CLARITY, POC: CLEAR
COLOR UR: YELLOW
EXPIRATION DATE: ABNORMAL
GLUCOSE UR STRIP-MCNC: NEGATIVE MG/DL
KETONES UR QL: NEGATIVE
LEUKOCYTE EST, POC: NEGATIVE
Lab: ABNORMAL
NITRITE UR-MCNC: NEGATIVE MG/ML
PH UR: 7 [PH] (ref 5–8)
PROT UR STRIP-MCNC: ABNORMAL MG/DL
RBC # UR STRIP: ABNORMAL /UL
SP GR UR: 1.01 (ref 1–1.03)
UROBILINOGEN UR QL: NORMAL

## 2022-08-11 PROCEDURE — 99213 OFFICE O/P EST LOW 20 MIN: CPT | Performed by: INTERNAL MEDICINE

## 2022-08-11 PROCEDURE — 81003 URINALYSIS AUTO W/O SCOPE: CPT | Performed by: INTERNAL MEDICINE

## 2022-08-11 NOTE — PROGRESS NOTES
Subjective   Misael Luna is a 52 y.o. male.     Chief Complaint   Patient presents with   • Urinary Tract Infection   • Hyperlipidemia       History of Present Illness   History of chronic urinary retention and abdominal pain still requiring the been laying down when he has self catheterizations every 2 hours. Continues to be followed by urology and  renal transplant team. No new changes in medications. Just finished another course of his antibiotics because of urinary tract infections that are recurrent.  Recent with infection and completed the augmentin 3 days ago.  No current fever, chills, Nausea or vomiting.  Always burns to use the catheter.     History of high cholesterol.  Currently, has been feeling well without any myalgias, muscle aches, weakness, numbness, chest pain, short of breath or other issues.  Currently, is adherent with medication regimen of simvastatin 20 mg daily and denies medication side effects.  Last lipid panel performed on 8/26/2021.       The following portions of the patient's history were reviewed and updated as appropriate: allergies, current medications, past family history, past medical history, past social history, past surgical history and problem list.    Depression Screen:  PHQ-2/PHQ-9 Depression Screening 1/31/2022   Retired PHQ-9 Total Score 6   Retired Total Score 6       Past Medical History:   Diagnosis Date   • Asthma    • Disorder of skin    • Generalized osteoarthritis    • Hematuria    • High risk sexual behavior    • History of closed fracture     Lower end of forearm, unspecified laterality    • History of kidney transplant     Kidney replaced by transplant   • Hypertension    • Incomplete emptying of bladder    • Obesity    • Penile lesion    • Tinea cruris    • Urinary obstruction    • Urinary tract infection        Past Surgical History:   Procedure Laterality Date   • BLADDER SURGERY      bladder augmentation   • NEPHRECTOMY     • TRANSPLANTATION RENAL   "1991       Family History   Problem Relation Age of Onset   • Arthritis Mother    • Asthma Mother    • Arthritis Father    • Asthma Father    • Heart disease Father    • COPD Father    • Hyperlipidemia Father    • Arthritis Maternal Grandmother    • Osteoporosis Maternal Grandfather    • Arthritis Maternal Grandfather    • No Known Problems Other        Social History     Socioeconomic History   • Marital status:    Tobacco Use   • Smoking status: Never Smoker   • Smokeless tobacco: Never Used   Substance and Sexual Activity   • Alcohol use: No   • Drug use: No       Current Outpatient Medications   Medication Sig Dispense Refill   • albuterol (ACCUNEB) 1.25 MG/3ML nebulizer solution Take 3 mL by nebulization Every 6 (Six) Hours As Needed for Wheezing. 120 each 6   • albuterol sulfate  (90 Base) MCG/ACT inhaler Inhale 2 puffs Every 4 (Four) Hours As Needed for Wheezing. 18 g 6   • azaTHIOprine (IMURAN) 50 MG tablet 150 mg.     • B-D 3CC LUER-DOMINGUEZ SYR 22GX1\" 22G X 1\" 3 ML misc      • B-D 3CC LUER-DOMINGUEZ SYR 23GX1\" 23G X 1\" 3 ML misc      • B-D HYPODERMIC NEEDLE 18GX1.5\" 18G X 1-1/2\" misc USE Q  2 WEEKS  3   • BD HYPODERMIC NEEDLE 18G X 1\" misc      • clonazePAM (KlonoPIN) 1 MG tablet Take 1 tablet by mouth 2 (Two) Times a Day As Needed for Anxiety. 60 tablet 1   • colchicine (Colcrys) 0.6 MG tablet Take 2 tabs initially then may take 1 tab 1 hour later. 6 tablet 0   • diclofenac (VOLTAREN) 1 % gel gel Apply 4 g topically to the appropriate area as directed 4 (Four) Times a Day As Needed (ankle/joint pain). 100 g 1   • fluticasone (Flonase) 50 MCG/ACT nasal spray 2 sprays into the nostril(s) as directed by provider Daily. 16 g 5   • furosemide (LASIX) 20 MG tablet      • gabapentin (NEURONTIN) 100 MG capsule Take 100 mg by mouth 3 (Three) Times a Day.  2   • lidocaine (LIDODERM) 5 % Place 1 patch on the skin as directed by provider Daily. Remove & Discard patch within 12 hours or as directed by MD 15 patch 3 "   • linaclotide (Linzess) 145 MCG capsule capsule Take 1 capsule by mouth Every Morning Before Breakfast. 30 capsule 5   • loratadine (Claritin) 10 MG tablet Take 1 tablet by mouth Daily. 30 tablet 6   • oxyCODONE (ROXICODONE) 20 MG tablet Take 20 mg by mouth Every 4 (Four) Hours As Needed for Moderate Pain .     • pantoprazole (PROTONIX) 40 MG EC tablet Take 1 tablet by mouth Daily. 30 tablet 3   • predniSONE (DELTASONE) 10 MG tablet      • simvastatin (Zocor) 20 MG tablet Take 1 tablet by mouth Every Night. 90 tablet 3   • Spacer/Aero-Holding Chambers device Use as directed with the inhalers 1 each 0   • Testosterone 20.25 MG/ACT (1.62%) gel      • ciprofloxacin (Cipro) 500 MG tablet Take 1 tablet by mouth 2 (Two) Times a Day. 14 tablet 0     No current facility-administered medications for this visit.       Review of Systems   Constitutional: Negative for activity change, appetite change, fatigue, fever, unexpected weight gain and unexpected weight loss.   HENT: Negative for nosebleeds, rhinorrhea, trouble swallowing and voice change.    Eyes: Negative for visual disturbance.   Respiratory: Negative for cough, chest tightness, shortness of breath and wheezing.    Cardiovascular: Negative for chest pain, palpitations and leg swelling.   Gastrointestinal: Negative for abdominal pain, blood in stool, constipation, diarrhea, nausea, vomiting, GERD and indigestion.   Genitourinary: Negative for hematuria.        Chronic urinary issues requiring the self catheterization.   Musculoskeletal: Negative for arthralgias, back pain and myalgias.   Skin: Negative for rash and wound.   Neurological: Negative for dizziness, tremors, weakness, light-headedness, numbness, headache and memory problem.   Hematological: Negative for adenopathy. Does not bruise/bleed easily.   Psychiatric/Behavioral: Negative for sleep disturbance and depressed mood. The patient is not nervous/anxious.        Objective   /82 (BP Location: Right  "arm, Patient Position: Sitting, Cuff Size: Large Adult)   Pulse 78   Temp 98 °F (36.7 °C) (Temporal)   Ht 170.2 cm (67.01\")   SpO2 98%   BMI 45.47 kg/m²     Physical Exam  Vitals and nursing note reviewed.   Constitutional:       General: He is not in acute distress.     Appearance: He is well-developed. He is obese. He is not diaphoretic.   HENT:      Head: Normocephalic and atraumatic.      Right Ear: External ear normal.      Left Ear: External ear normal.      Nose: Nose normal.   Eyes:      Conjunctiva/sclera: Conjunctivae normal.      Pupils: Pupils are equal, round, and reactive to light.   Neck:      Thyroid: No thyromegaly.      Trachea: No tracheal deviation.   Cardiovascular:      Rate and Rhythm: Normal rate and regular rhythm.      Heart sounds: Normal heart sounds. No murmur heard.    No friction rub. No gallop.   Pulmonary:      Effort: Pulmonary effort is normal. No respiratory distress.      Breath sounds: Normal breath sounds.   Abdominal:      General: Bowel sounds are normal.      Palpations: Abdomen is soft. There is no mass.      Tenderness: There is no abdominal tenderness. There is no guarding.   Musculoskeletal:         General: Normal range of motion.      Cervical back: Normal range of motion and neck supple.   Lymphadenopathy:      Cervical: No cervical adenopathy.   Skin:     General: Skin is warm and dry.      Capillary Refill: Capillary refill takes less than 2 seconds.      Findings: No rash.   Neurological:      Mental Status: He is alert and oriented to person, place, and time.      Motor: No abnormal muscle tone.      Deep Tendon Reflexes: Reflexes normal.   Psychiatric:         Behavior: Behavior normal.         Thought Content: Thought content normal.         Judgment: Judgment normal.         Recent Results (from the past 2016 hour(s))   POCT urinalysis dipstick, automated    Collection Time: 08/11/22  4:13 PM    Specimen: Urine   Result Value Ref Range    Color Yellow " Yellow, Straw, Dark Yellow, Mackenzie    Clarity, UA Clear Clear    Specific Gravity  1.015 1.005 - 1.030    pH, Urine 7.0 5.0 - 8.0    Leukocytes Negative Negative    Nitrite, UA Negative Negative    Protein, POC 2+ (A) Negative mg/dL    Glucose, UA Negative Negative mg/dL    Ketones, UA Negative Negative    Urobilinogen, UA Normal Normal    Bilirubin Negative Negative    Blood, UA 2+ (A) Negative    Lot Number [id number]     Expiration Date 01/08/2023      Assessment & Plan   Diagnoses and all orders for this visit:    1. Urinary symptom or sign (Primary)  -     POCT urinalysis dipstick, automated    2. Traumatic hematuria    3. Hyperlipidemia, unspecified hyperlipidemia type  -     Comprehensive Metabolic Panel; Future  -     Lipid Panel; Future    4. Benign essential hypertension  -     Comprehensive Metabolic Panel; Future  -     Lipid Panel; Future    5. High risk medication use  -     Compliance Drug Analysis, Ur - Urine, Clean Catch; Future    No evidence of urinary tract infection based on his urinalysis.  Symptoms are likely secondary to his chronic In-N-Out cath with scar tissue.  We will continue the current treatment plan and we will check the labs as ordered above.           · COVID-19 Precautions - Patient was compliant in wearing a mask. When I saw the patient, I used appropriate personal protective equipment (PPE) including mask and eye shield (standard procedure).  Additionally, I used gown and gloves if indicated.  Hand hygiene was completed before and after seeing the patient.  · Dictated utilizing Dragon Dictation

## 2022-09-08 DIAGNOSIS — F41.9 ANXIETY: ICD-10-CM

## 2022-09-12 RX ORDER — CLONAZEPAM 1 MG/1
1 TABLET ORAL 2 TIMES DAILY PRN
Qty: 60 TABLET | Refills: 1 | Status: SHIPPED | OUTPATIENT
Start: 2022-09-12 | End: 2022-11-17 | Stop reason: SDUPTHER

## 2022-09-12 NOTE — TELEPHONE ENCOUNTER
Rx Refill Note  Requested Prescriptions     Pending Prescriptions Disp Refills   • clonazePAM (KlonoPIN) 1 MG tablet 60 tablet 1     Sig: Take 1 tablet by mouth 2 (Two) Times a Day As Needed for Anxiety.      Last office visit with prescribing clinician: 8/11/2022      Next office visit with prescribing clinician: Visit date not found            Mary Kapoor MA  09/12/22, 11:56 EDT

## 2022-09-13 RX ORDER — PANTOPRAZOLE SODIUM 40 MG/1
40 TABLET, DELAYED RELEASE ORAL DAILY
Qty: 30 TABLET | Refills: 3 | OUTPATIENT
Start: 2022-09-13

## 2022-09-13 NOTE — TELEPHONE ENCOUNTER
Rx Refill Note  Requested Prescriptions     Pending Prescriptions Disp Refills   • pantoprazole (PROTONIX) 40 MG EC tablet 30 tablet 3     Sig: Take 1 tablet by mouth Daily.      Last office visit with prescribing clinician: 8/11/2022      Next office visit with prescribing clinician: Visit date not found

## 2022-11-17 ENCOUNTER — OFFICE VISIT (OUTPATIENT)
Dept: FAMILY MEDICINE CLINIC | Facility: CLINIC | Age: 52
End: 2022-11-17

## 2022-11-17 VITALS
HEIGHT: 67 IN | HEART RATE: 69 BPM | TEMPERATURE: 97.8 F | SYSTOLIC BLOOD PRESSURE: 132 MMHG | OXYGEN SATURATION: 100 % | BODY MASS INDEX: 45.48 KG/M2 | DIASTOLIC BLOOD PRESSURE: 84 MMHG

## 2022-11-17 DIAGNOSIS — F41.9 ANXIETY: ICD-10-CM

## 2022-11-17 DIAGNOSIS — I10 BENIGN ESSENTIAL HYPERTENSION: Primary | ICD-10-CM

## 2022-11-17 DIAGNOSIS — E78.5 HYPERLIPIDEMIA, UNSPECIFIED HYPERLIPIDEMIA TYPE: ICD-10-CM

## 2022-11-17 DIAGNOSIS — Z23 IMMUNIZATION DUE: ICD-10-CM

## 2022-11-17 DIAGNOSIS — Z28.21 COVID-19 VACCINATION REFUSED: ICD-10-CM

## 2022-11-17 DIAGNOSIS — R25.2 BILATERAL LEG CRAMPS: ICD-10-CM

## 2022-11-17 PROCEDURE — 90686 IIV4 VACC NO PRSV 0.5 ML IM: CPT | Performed by: INTERNAL MEDICINE

## 2022-11-17 PROCEDURE — G0008 ADMIN INFLUENZA VIRUS VAC: HCPCS | Performed by: INTERNAL MEDICINE

## 2022-11-17 PROCEDURE — 99213 OFFICE O/P EST LOW 20 MIN: CPT | Performed by: INTERNAL MEDICINE

## 2022-11-17 RX ORDER — CLONAZEPAM 1 MG/1
1 TABLET ORAL 2 TIMES DAILY PRN
Qty: 60 TABLET | Refills: 2 | Status: SHIPPED | OUTPATIENT
Start: 2022-11-17 | End: 2023-02-16 | Stop reason: SDUPTHER

## 2022-11-17 RX ORDER — PANTOPRAZOLE SODIUM 40 MG/1
40 TABLET, DELAYED RELEASE ORAL DAILY
Qty: 90 TABLET | Refills: 3 | Status: SHIPPED | OUTPATIENT
Start: 2022-11-17

## 2022-11-17 RX ORDER — FLUTICASONE PROPIONATE 50 MCG
2 SPRAY, SUSPENSION (ML) NASAL DAILY
COMMUNITY

## 2022-11-17 NOTE — PROGRESS NOTES
Subjective   Misael Luna is a 52 y.o. male.     Chief Complaint   Patient presents with   • Muscle Pain     Mainly the right leg - when he points is foot to put socks on and wakes him up while sleeping       History of Present Illness   Having leg cramps for the last 8 months and progressively worsening and awakens at night with spasms in the calves.    History of chronic urinary retention and abdominal pain still requiring the been laying down when he has self catheterizations every 2 hours. Continues to be followed by urology and [facility] renal transplant team. No new changes in medications. Just finished another course of his antibiotics because of urinary tract infections that are recurrent.  No current fever, chills, Nausea or vomiting.  Always burns to use the catheter.  Has appointment with [facility] transplant clinic later today to have labs.     History of high cholesterol.  Currently, has been feeling well without any myalgias, muscle aches, weakness, numbness, chest pain, short of breath or other issues.  Currently, is adherent with medication regimen of simvastatin 20 mg daily and denies medication side effects.  Last lipid panel performed on 8/26/2021 and repeat labs ordered 8/13/2022 but not completed.    Anxiety doing well and using the clonazepam about 2 times per day and tolerating well with no side effects.    The following portions of the patient's history were reviewed and updated as appropriate: allergies, current medications, past family history, past medical history, past social history, past surgical history and problem list.    Depression Screen:  PHQ-2/PHQ-9 Depression Screening 1/31/2022   Retired PHQ-9 Total Score 6   Retired Total Score 6       Past Medical History:   Diagnosis Date   • Asthma    • Disorder of skin    • Generalized osteoarthritis    • Hematuria    • High risk sexual behavior    • History of closed fracture     Lower end of forearm, unspecified laterality    • History of kidney  transplant     Kidney replaced by transplant   • Hypertension    • Incomplete emptying of bladder    • Obesity    • Penile lesion    • Tinea cruris    • Urinary obstruction    • Urinary tract infection        Past Surgical History:   Procedure Laterality Date   • BLADDER SURGERY      bladder augmentation   • NEPHRECTOMY     • TRANSPLANTATION RENAL  1991       Family History   Problem Relation Age of Onset   • Arthritis Mother    • Asthma Mother    • Arthritis Father    • Asthma Father    • Heart disease Father    • COPD Father    • Hyperlipidemia Father    • Arthritis Maternal Grandmother    • Osteoporosis Maternal Grandfather    • Arthritis Maternal Grandfather    • No Known Problems Other        Social History     Socioeconomic History   • Marital status:    Tobacco Use   • Smoking status: Never   • Smokeless tobacco: Never   Substance and Sexual Activity   • Alcohol use: No   • Drug use: No       Current Outpatient Medications   Medication Sig Dispense Refill   • albuterol (ACCUNEB) 1.25 MG/3ML nebulizer solution Take 3 mL by nebulization Every 6 (Six) Hours As Needed for Wheezing. 120 each 6   • albuterol sulfate  (90 Base) MCG/ACT inhaler Inhale 2 puffs Every 4 (Four) Hours As Needed for Wheezing. 18 g 6   • azaTHIOprine (IMURAN) 50 MG tablet 150 mg.     • ciprofloxacin (Cipro) 500 MG tablet Take 1 tablet by mouth 2 (Two) Times a Day. 14 tablet 0   • clonazePAM (KlonoPIN) 1 MG tablet Take 1 tablet by mouth 2 (Two) Times a Day As Needed for Anxiety. 60 tablet 2   • fluticasone (FLONASE) 50 MCG/ACT nasal spray 2 sprays into the nostril(s) as directed by provider Daily.     • furosemide (LASIX) 20 MG tablet      • gabapentin (NEURONTIN) 100 MG capsule Take 100 mg by mouth 3 (Three) Times a Day.  2   • loratadine (Claritin) 10 MG tablet Take 1 tablet by mouth Daily. 30 tablet 6   • oxyCODONE (ROXICODONE) 20 MG tablet Take 20 mg by mouth Every 4 (Four) Hours As Needed for Moderate Pain .     •  "pantoprazole (PROTONIX) 40 MG EC tablet Take 1 tablet by mouth Daily. 90 tablet 3   • predniSONE (DELTASONE) 10 MG tablet      • simvastatin (Zocor) 20 MG tablet Take 1 tablet by mouth Every Night. 90 tablet 3   • Spacer/Aero-Holding Chambers device Use as directed with the inhalers 1 each 0   • Testosterone 20.25 MG/ACT (1.62%) gel      • lidocaine (LIDODERM) 5 % Place 1 patch on the skin as directed by provider Daily. Remove & Discard patch within 12 hours or as directed by MD 15 patch 3     No current facility-administered medications for this visit.       Review of Systems   Constitutional: Negative for activity change, appetite change, fatigue, fever, unexpected weight gain and unexpected weight loss.   HENT: Negative for nosebleeds, rhinorrhea, trouble swallowing and voice change.    Eyes: Negative for visual disturbance.   Respiratory: Negative for cough, chest tightness, shortness of breath and wheezing.    Cardiovascular: Negative for chest pain, palpitations and leg swelling.   Gastrointestinal: Negative for abdominal pain, blood in stool, constipation, diarrhea, nausea, vomiting, GERD and indigestion.   Endocrine:        Dry mouth   Genitourinary: Negative for dysuria, frequency and hematuria.        Chronic urinary issues requiring the self catheterization.    Musculoskeletal: Negative for arthralgias, back pain and myalgias.        Leg cramps and spasms.   Skin: Negative for rash and wound.   Neurological: Negative for dizziness, tremors, weakness, light-headedness, numbness, headache and memory problem.   Hematological: Negative for adenopathy. Does not bruise/bleed easily.   Psychiatric/Behavioral: Negative for sleep disturbance and depressed mood. The patient is not nervous/anxious.        Objective   /84   Pulse 69   Temp 97.8 °F (36.6 °C) (Infrared)   Ht 170.2 cm (67\")   SpO2 100%   BMI 45.48 kg/m²     Physical Exam  Vitals and nursing note reviewed.   Constitutional:       General: He " is not in acute distress.     Appearance: He is well-developed. He is obese. He is not diaphoretic.   HENT:      Head: Normocephalic and atraumatic.      Right Ear: External ear normal.      Left Ear: External ear normal.      Nose: Nose normal.   Eyes:      Conjunctiva/sclera: Conjunctivae normal.      Pupils: Pupils are equal, round, and reactive to light.   Neck:      Thyroid: No thyromegaly.      Trachea: No tracheal deviation.   Cardiovascular:      Rate and Rhythm: Normal rate and regular rhythm.      Heart sounds: Normal heart sounds. No murmur heard.    No friction rub. No gallop.   Pulmonary:      Effort: Pulmonary effort is normal. No respiratory distress.      Breath sounds: Normal breath sounds.   Abdominal:      General: Bowel sounds are normal.      Palpations: Abdomen is soft. There is no mass.      Tenderness: There is no abdominal tenderness. There is no guarding.   Musculoskeletal:         General: Normal range of motion.      Cervical back: Normal range of motion and neck supple.   Lymphadenopathy:      Cervical: No cervical adenopathy.   Skin:     General: Skin is warm and dry.      Capillary Refill: Capillary refill takes less than 2 seconds.      Findings: No rash.   Neurological:      Mental Status: He is alert and oriented to person, place, and time.      Motor: No abnormal muscle tone.      Deep Tendon Reflexes: Reflexes normal.   Psychiatric:         Behavior: Behavior normal.         Thought Content: Thought content normal.         Judgment: Judgment normal.       No results found for this or any previous visit (from the past 2016 hour(s)).  Assessment & Plan   Diagnoses and all orders for this visit:    1. Benign essential hypertension (Primary)    2. Hyperlipidemia, unspecified hyperlipidemia type    3. Immunization due  -     FluLaval/Fluzone >6 mos ([id number])    4. Bilateral leg cramps    5. COVID-19 vaccination refused    6. Anxiety  -     clonazePAM (KlonoPIN) 1 MG tablet; Take 1  tablet by mouth 2 (Two) Times a Day As Needed for Anxiety.  Dispense: 60 tablet; Refill: 2    Other orders  -     pantoprazole (PROTONIX) 40 MG EC tablet; Take 1 tablet by mouth Daily.  Dispense: 90 tablet; Refill: 3    Recommend patient to see the transplant clinic as scheduled later today and have labs.  Recommend to have at a minimum CBC, iron study, CMP, magnesium, creatine kinase and lipid panel.  Leg cramps/spasms likely secondary to electrolytes such as magnesium.  Continue the current medications.  YU run and reviewed.  Risks of the medication include but are not limited to fatigue, somnolence, increased risk of falls, constipation, allergic reaction, dependence, and addiction         · COVID-19 Precautions - Patient was compliant in wearing a mask. When I saw the patient, I used appropriate personal protective equipment (PPE) including mask and eye shield (standard procedure).  Additionally, I used gown and gloves if indicated.  Hand hygiene was completed before and after seeing the patient.  · Dictated utilizing Dragon Dictation

## 2022-12-17 DIAGNOSIS — E78.5 HYPERLIPIDEMIA, UNSPECIFIED HYPERLIPIDEMIA TYPE: ICD-10-CM

## 2022-12-20 RX ORDER — SIMVASTATIN 20 MG
20 TABLET ORAL NIGHTLY
Qty: 90 TABLET | Refills: 3 | Status: SHIPPED | OUTPATIENT
Start: 2022-12-20

## 2023-02-16 ENCOUNTER — OFFICE VISIT (OUTPATIENT)
Dept: FAMILY MEDICINE CLINIC | Facility: CLINIC | Age: 53
End: 2023-02-16
Payer: MEDICARE

## 2023-02-16 VITALS
TEMPERATURE: 98.4 F | SYSTOLIC BLOOD PRESSURE: 132 MMHG | HEIGHT: 67 IN | BODY MASS INDEX: 49.44 KG/M2 | WEIGHT: 315 LBS | DIASTOLIC BLOOD PRESSURE: 78 MMHG

## 2023-02-16 DIAGNOSIS — Z28.21 COVID-19 VACCINATION REFUSED: ICD-10-CM

## 2023-02-16 DIAGNOSIS — Z00.00 MEDICARE ANNUAL WELLNESS VISIT, SUBSEQUENT: Primary | ICD-10-CM

## 2023-02-16 DIAGNOSIS — M79.641 RIGHT HAND PAIN: ICD-10-CM

## 2023-02-16 DIAGNOSIS — Z12.11 SCREENING FOR COLON CANCER: ICD-10-CM

## 2023-02-16 DIAGNOSIS — F41.9 ANXIETY: ICD-10-CM

## 2023-02-16 PROBLEM — N39.0 RECURRENT UTI: Status: ACTIVE | Noted: 2022-11-17

## 2023-02-16 PROBLEM — N18.32 ANEMIA OF CHRONIC RENAL FAILURE, STAGE 3B (HCC): Status: ACTIVE | Noted: 2021-09-23

## 2023-02-16 PROBLEM — D63.1 ANEMIA OF CHRONIC RENAL FAILURE, STAGE 3B (HCC): Status: ACTIVE | Noted: 2021-09-23

## 2023-02-16 PROCEDURE — G0439 PPPS, SUBSEQ VISIT: HCPCS | Performed by: INTERNAL MEDICINE

## 2023-02-16 PROCEDURE — 1159F MED LIST DOCD IN RCRD: CPT | Performed by: INTERNAL MEDICINE

## 2023-02-16 PROCEDURE — 96160 PT-FOCUSED HLTH RISK ASSMT: CPT | Performed by: INTERNAL MEDICINE

## 2023-02-16 PROCEDURE — 1170F FXNL STATUS ASSESSED: CPT | Performed by: INTERNAL MEDICINE

## 2023-02-16 RX ORDER — COLCHICINE 0.6 MG/1
0.6 TABLET ORAL 2 TIMES DAILY
Qty: 20 TABLET | Refills: 0 | Status: SHIPPED | OUTPATIENT
Start: 2023-02-16

## 2023-02-16 RX ORDER — CLONAZEPAM 1 MG/1
1 TABLET ORAL 2 TIMES DAILY PRN
Qty: 60 TABLET | Refills: 2 | Status: SHIPPED | OUTPATIENT
Start: 2023-02-16

## 2023-02-16 NOTE — PROGRESS NOTES
Subsequent Medicare Wellness Visit   The ABC's of the Annual Wellness Visit    Chief Complaint   Patient presents with   • Med Refill   • Hand Pain     Right hand pain        HPI:  Misael Luna, -1970, is a 52 y.o. male who presents for a Subsequent Medicare Wellness Visit.    Two weeks of right hand pain, stiff and swelling.  Tried the diclofenac gel  A few times with no relief.    History of chronic urinary retention and abdominal pain still requiring the been laying down when he has self catheterizations every 2 hours. Continues to be followed by urology and [facility] renal transplant team. No new changes in medications. Just finished another course of his antibiotics because of urinary tract infections that are recurrent.  No current fever, chills, Nausea or vomiting.  Always burns to use the catheter.  Has appointment with  transplant clinic later today to have labs.     History of high cholesterol.  Currently, has been feeling well without any myalgias, muscle aches, weakness, numbness, chest pain, short of breath or other issues.  Currently, is adherent with medication regimen of simvastatin 20 mg daily and denies medication side effects.  Last lipid panel performed on 2021 and repeat labs ordered 2022 but not completed.     Anxiety doing well and using the clonazepam about 2 times per day and tolerating well with no side effects.    Recent Hospitalizations:  No hospitalization(s) within the last year..    Current Medical Providers:  Patient Care Team:  Guanako Coleman MD as PCP - General (Internal Medicine)    Health Habits and Functional and Cognitive Screening and Depression Screening:  Functional & Cognitive Status 2023   Do you have difficulty preparing food and eating? No   Do you have difficulty bathing yourself, getting dressed or grooming yourself? No   Do you have difficulty using the toilet? No   Do you have difficulty moving around from place to place? No   Do you have  trouble with steps or getting out of a bed or a chair? No   Current Diet Well Balanced Diet   Dental Exam Not up to date   Eye Exam Not up to date   Exercise (times per week) 0 times per week   Do you need help using the phone?  No   Are you deaf or do you have serious difficulty hearing?  No   Do you need help with transportation? No   Do you need help shopping? No   Do you need help preparing meals?  No   Do you need help with housework?  No   Do you need help with laundry? No   Do you need help taking your medications? No   Do you need help managing money? No   Do you ever drive or ride in a car without wearing a seat belt? No   Have you felt unusual stress, anger or loneliness in the last month? No   Who do you live with? Spouse   If you need help, do you have trouble finding someone available to you? -   Have you been bothered in the last four weeks by sexual problems? -   Do you have difficulty concentrating, remembering or making decisions? -     Compared to one year ago, the patient feels his physical health is the same and his mental health is the same.    Depression Screen:  PHQ-2/PHQ-9 Depression Screening 2/16/2023   Retired PHQ-9 Total Score -   Retired Total Score -   Little Interest or Pleasure in Doing Things 0-->not at all   Feeling Down, Depressed or Hopeless 0-->not at all   PHQ-9: Brief Depression Severity Measure Score 0     Falls Risk Assessment:  YOKASTA Fall Risk Clinician Key Questions   Have you fallen in the past year?: Yes (fell 3 times this month)    Past Medical/Family/Social History:  The following portions of the patient's history were reviewed and updated as appropriate: allergies, current medications, past family history, past medical history, past social history, past surgical history and problem list.    No Known Allergies    Current Outpatient Medications:   •  clonazePAM (KlonoPIN) 1 MG tablet, Take 1 tablet by mouth 2 (Two) Times a Day As Needed for Anxiety., Disp: 60 tablet,  Rfl: 2  •  albuterol (ACCUNEB) 1.25 MG/3ML nebulizer solution, Take 3 mL by nebulization Every 6 (Six) Hours As Needed for Wheezing., Disp: 120 each, Rfl: 6  •  albuterol sulfate  (90 Base) MCG/ACT inhaler, Inhale 2 puffs Every 4 (Four) Hours As Needed for Wheezing., Disp: 18 g, Rfl: 6  •  azaTHIOprine (IMURAN) 50 MG tablet, 150 mg., Disp: , Rfl:   •  colchicine 0.6 MG tablet, Take 1 tablet by mouth 2 (Two) Times a Day., Disp: 20 tablet, Rfl: 0  •  fluticasone (FLONASE) 50 MCG/ACT nasal spray, 2 sprays into the nostril(s) as directed by provider Daily., Disp: , Rfl:   •  furosemide (LASIX) 20 MG tablet, , Disp: , Rfl:   •  gabapentin (NEURONTIN) 100 MG capsule, Take 100 mg by mouth 3 (Three) Times a Day., Disp: , Rfl: 2  •  lidocaine (LIDODERM) 5 %, Place 1 patch on the skin as directed by provider Daily. Remove & Discard patch within 12 hours or as directed by MD, Disp: 15 patch, Rfl: 3  •  loratadine (Claritin) 10 MG tablet, Take 1 tablet by mouth Daily., Disp: 30 tablet, Rfl: 6  •  oxyCODONE (ROXICODONE) 20 MG tablet, Take 20 mg by mouth Every 4 (Four) Hours As Needed for Moderate Pain ., Disp: , Rfl:   •  pantoprazole (PROTONIX) 40 MG EC tablet, Take 1 tablet by mouth Daily., Disp: 90 tablet, Rfl: 3  •  predniSONE (DELTASONE) 10 MG tablet, , Disp: , Rfl:   •  simvastatin (Zocor) 20 MG tablet, Take 1 tablet by mouth Every Night., Disp: 90 tablet, Rfl: 3  •  Spacer/Aero-Holding Chambers device, Use as directed with the inhalers, Disp: 1 each, Rfl: 0  •  Testosterone 20.25 MG/ACT (1.62%) gel, , Disp: , Rfl:     Aspirin use counseling: Does not need ASA (and currently is not on it)    Current medication list contains no high risk medications.  No harmful drug interactions have been identified.     Family History   Problem Relation Age of Onset   • Arthritis Mother    • Asthma Mother    • Arthritis Father    • Asthma Father    • Heart disease Father    • COPD Father    • Hyperlipidemia Father    • Arthritis  Maternal Grandmother    • Osteoporosis Maternal Grandfather    • Arthritis Maternal Grandfather    • No Known Problems Other      Social History     Tobacco Use   • Smoking status: Never   • Smokeless tobacco: Never   Substance Use Topics   • Alcohol use: No     Past Surgical History:   Procedure Laterality Date   • BLADDER SURGERY      bladder augmentation   • NEPHRECTOMY     • TRANSPLANTATION RENAL  1991     Patient Active Problem List   Diagnosis   • Asthma   • BPV (benign positional vertigo)   • Depression   • Anxiety   • Generalized osteoarthritis of multiple sites   • GERD (gastroesophageal reflux disease)   • H/O kidney transplant   • Hyperlipidemia   • Incomplete emptying of bladder   • Osteopenia   • Obesity, morbid, BMI 40.0-49.9 ([facility])   • Palpitations   • Panic attacks   • Sleep paralysis   • Plantar fasciitis   • Traumatic hematuria   • Urinary (tract) obstruction   • Current chronic use of systemic steroids   • Testosterone deficiency in male   • Urinary tract infection   • Acute right ankle pain   • Left shoulder pain   • Left arm pain   • Left arm numbness   • Left arm weakness   • Radicular pain in left arm   • Weakness of left hand   • Partial tear of left subscapularis tendon   • Tear of left supraspinatus tendon   • Left foot pain   • Long term systemic steroid user   • Right hand pain   • Suprapubic tenderness   • Colon cancer screening   • Bilateral leg cramps   • Constipation   • Stage 3b chronic kidney disease (HCC)   • Immunosuppression ([facility])   • Benign essential hypertension   • Medicare annual wellness visit, subsequent   • Immunization due   • COVID-19 vaccination refused   • Recurrent UTI   • Anemia of chronic renal failure, stage 3b ([facility])     Review of Systems   Constitutional: Negative for activity change, appetite change, fatigue, fever, unexpected weight gain and unexpected weight loss.   HENT: Negative for nosebleeds, rhinorrhea, trouble swallowing and voice change.    Eyes: Negative  "for visual disturbance.   Respiratory: Negative for cough, chest tightness, shortness of breath and wheezing.    Cardiovascular: Negative for chest pain, palpitations and leg swelling.   Gastrointestinal: Negative for abdominal pain, blood in stool, constipation, diarrhea, nausea, vomiting, GERD and indigestion.   Genitourinary: Negative for dysuria, frequency and hematuria.        Chronic urinary issues requiring the self catheterization.     Musculoskeletal: Negative for arthralgias, back pain and myalgias.        Right hand pain and swelling   Skin: Negative for rash and wound.   Neurological: Negative for dizziness, tremors, weakness, light-headedness, numbness, headache and memory problem.   Hematological: Negative for adenopathy. Does not bruise/bleed easily.   Psychiatric/Behavioral: Negative for sleep disturbance and depressed mood. The patient is not nervous/anxious.        Objective     Vitals:    02/16/23 1003   BP: 132/78   BP Location: Right arm   Patient Position: Sitting   Cuff Size: Large Adult   Temp: 98.4 °F (36.9 °C)   Weight: (!) 152 kg (335 lb)   Height: 170.2 cm (67\")     BMI cannot be calculated due to outdated height or weight values.  Please input a current height/weight in Vitals and re-renter BMIFOLLOWUP in Note to pull in correct documentation based on BMI range.    The patient has no evidence of cognitve impairment.     Physical Exam  Vitals and nursing note reviewed.   Constitutional:       General: He is not in acute distress.     Appearance: He is well-developed. He is not diaphoretic.   HENT:      Head: Normocephalic and atraumatic.      Right Ear: External ear normal.      Left Ear: External ear normal.      Nose: Nose normal.   Eyes:      Conjunctiva/sclera: Conjunctivae normal.      Pupils: Pupils are equal, round, and reactive to light.   Neck:      Thyroid: No thyromegaly.      Trachea: No tracheal deviation.   Cardiovascular:      Rate and Rhythm: Normal rate and regular " rhythm.      Heart sounds: Normal heart sounds. No murmur heard.    No friction rub. No gallop.   Pulmonary:      Effort: Pulmonary effort is normal. No respiratory distress.      Breath sounds: Normal breath sounds.   Abdominal:      General: Bowel sounds are normal.      Palpations: Abdomen is soft. There is no mass.      Tenderness: There is no abdominal tenderness. There is no guarding.   Musculoskeletal:         General: Normal range of motion.      Cervical back: Normal range of motion and neck supple.      Comments: Tenderness in the base of the right 4th finger with mild swelling.  Able to  will but with pain on dorsiflexion.   Lymphadenopathy:      Cervical: No cervical adenopathy.   Skin:     General: Skin is warm and dry.      Capillary Refill: Capillary refill takes less than 2 seconds.      Findings: No rash.   Neurological:      Mental Status: He is alert and oriented to person, place, and time.      Motor: No abnormal muscle tone.      Deep Tendon Reflexes: Reflexes normal.   Psychiatric:         Behavior: Behavior normal.         Thought Content: Thought content normal.         Judgment: Judgment normal.         Recent Lab Results:  Lab Results   Component Value Date    GLU 91 09/23/2021     Lab Results   Component Value Date    TRIG 291 (H) 08/26/2021    HDL 39 (L) 08/26/2021    VLDL 49 (H) 08/26/2021       Assessment & Plan   Age-appropriate Screening Schedule:  Refer to the list below for future screening recommendations based on patient's age, sex and/or medical conditions.      Health Maintenance   Topic Date Due   • ZOSTER VACCINE (1 of 2) Never done   • DXA SCAN  06/10/2023   • LIPID PANEL  11/17/2023   • TDAP/TD VACCINES (2 - Td or Tdap) 08/04/2028   • INFLUENZA VACCINE  Completed     Medicare Risks and Personalized Health Plan:  Advance Directive Discussion  Fall Risk  Glaucoma Risk  Immunizations Discussed/Encouraged (specific immunizations; Shingrix and COVID19 )  Obesity/Overweight      CMS-Preventive Services Quick Reference  Medicare Preventive Services Addressed:  Annual Wellness Visit (AWV)  Glaucoma screening (for individuals with diabetes mellitus, family history of glaucoma, -Americans (> or =) age 50, -Americans (> or =) age 65)    Advance Care Planning:  ACP discussion was held with the patient during this visit. Patient does not have an advance directive, declines further assistance.    Diagnoses and all orders for this visit:    1. Medicare annual wellness visit, subsequent (Primary)    2. Right hand pain  -     colchicine 0.6 MG tablet; Take 1 tablet by mouth 2 (Two) Times a Day.  Dispense: 20 tablet; Refill: 0    3. Anxiety  -     clonazePAM (KlonoPIN) 1 MG tablet; Take 1 tablet by mouth 2 (Two) Times a Day As Needed for Anxiety.  Dispense: 60 tablet; Refill: 2    4. Screening for colon cancer  -     Ambulatory Referral For Screening Colonoscopy    5. COVID-19 vaccination refused    Annual wellness visit reviewed with patient.  All past history, medications, social history, and problem list were reviewed.  Discussed advanced directives and living will.  Patient has living will: Living will: Patient refused.  Discussed fall risk and precautions encourage removing throw rugs and using grab bars within the home and bathroom.  Will check the labs as ordered above to evaluate the blood sugars, kidney, liver, cholesterol for screening.  Discussed flu shot recommended to get the high-dose influenza vaccine annually in the fall.    Prevnar-13 and pneumovax-23 up to date and appropriate.  Shingrix vaccination series recommended.  Encourage follow-up with the eye doctor on annual basis for glaucoma evaluation.  Discussed weight and encouraged exercise as tolerated while following a healthy diet.  Colon cancer screening discussed and current status: discussed once again and again recommended the colonoscopy.  Discussed prostate screening for which he is currently seeing  urology.  Follow up with current specialists as needed.  An After Visit Summary and PPPS with all of these plans were given to the patient.      Discussed with patient right hand pain with several flares and no injury likely gout and will do trial of colchicine.  YU run and reviewed.  Risks of the medication include but are not limited to fatigue, somnolence, increased risk of falls, constipation, allergic reaction, dependence, and addiction      Follow Up:  Return if symptoms worsen or fail to improve.           · COVID-19 Precautions - Patient was compliant in wearing a mask. When I saw the patient, I used appropriate personal protective equipment (PPE) including mask and eye shield (standard procedure).  Additionally, I used gown and gloves if indicated.  Hand hygiene was completed before and after seeing the patient.  · Dictated utilizing Dragon Dictation

## 2023-02-16 NOTE — PATIENT INSTRUCTIONS
Medicare Wellness  Personal Prevention Plan of Service     Date of Office Visit:    Encounter Provider:  Guanako Coleman MD  Place of Service:  [facility]  Patient Name: Misael Luna  :  1970    As part of the Medicare Wellness portion of your visit today, we are providing you with this personalized preventive plan of services (PPPS). This plan is based upon recommendations of the [place] Preventive Services Task Force (USPSTF) and the Advisory Committee on Immunization Practices (ACIP).    This lists the preventive care services that should be considered, and provides dates of when you are due. Items listed as completed are up-to-date and do not require any further intervention.    Health Maintenance   Topic Date Due    COLORECTAL CANCER SCREENING  Never done    ZOSTER VACCINE (1 of 2) Never done    HEPATITIS C SCREENING  Never done    COVID-19 Vaccine (1) 2023 (Originally 1970)    DXA SCAN  06/10/2023    LIPID PANEL  2023    ANNUAL WELLNESS VISIT  2024    Pneumococcal Vaccine 0-64 (3 - PPSV23 if available, else PCV20) 2025    TDAP/TD VACCINES (2 - Td or Tdap) 2028    INFLUENZA VACCINE  Completed       Orders Placed This Encounter   Procedures    Ambulatory Referral For Screening Colonoscopy     Referral Priority:   Routine     Referral Type:   Diagnostic Medical     Referral Reason:   Specialty Services Required     Number of Visits Requested:   1       Return if symptoms worsen or fail to improve.

## 2023-05-15 ENCOUNTER — TELEMEDICINE (OUTPATIENT)
Dept: FAMILY MEDICINE CLINIC | Facility: CLINIC | Age: 53
End: 2023-05-15
Payer: MEDICARE

## 2023-05-15 DIAGNOSIS — K21.9 GASTROESOPHAGEAL REFLUX DISEASE WITHOUT ESOPHAGITIS: Primary | ICD-10-CM

## 2023-05-15 DIAGNOSIS — E55.9 VITAMIN D DEFICIENCY: ICD-10-CM

## 2023-05-15 DIAGNOSIS — F41.9 ANXIETY: ICD-10-CM

## 2023-05-15 PROBLEM — D63.1 ANEMIA DUE TO STAGE 3A CHRONIC KIDNEY DISEASE: Status: ACTIVE | Noted: 2023-05-15

## 2023-05-15 PROBLEM — N18.31 ANEMIA DUE TO STAGE 3A CHRONIC KIDNEY DISEASE: Status: ACTIVE | Noted: 2023-05-15

## 2023-05-15 PROBLEM — N25.81 SECONDARY HYPERPARATHYROIDISM OF RENAL ORIGIN: Status: ACTIVE | Noted: 2023-05-15

## 2023-05-15 RX ORDER — CLONAZEPAM 1 MG/1
1 TABLET ORAL 2 TIMES DAILY PRN
Qty: 60 TABLET | Refills: 2 | Status: SHIPPED | OUTPATIENT
Start: 2023-05-15

## 2023-05-15 NOTE — PROGRESS NOTES
Subjective   Misael Luna is a 53 y.o. male.     Chief Complaint   Patient presents with   • Anxiety   • Fatigue       History of Present Illness   You have chosen to receive care through a telehealth visit.  Do you consent to use a video/audio connection for your medical care today? Yes  Video visit completed utilizing [employer] video visit.  Patient location in home in [facility].  Physician location in office in [facility].    Anxiety doing well and using the clonazepam about 2 times per day and tolerating well with no side effects.    History of chronic urinary retention and abdominal pain still requiring the been laying down when he has self catheterizations every 2 hours. Continues to be followed by urology and [facility] renal transplant team. No new changes in medications. Just finished another course of his antibiotics because of urinary tract infections that are recurrent.  No current fever, chills, Nausea or vomiting.  Always burns to use the catheter.  Has appointment with [facility] transplant clinic later today to have labs.     History of high cholesterol.  Currently, has been feeling well without any myalgias, muscle aches, weakness, numbness, chest pain, short of breath or other issues.  Currently, is adherent with medication regimen of simvastatin 20 mg daily and denies medication side effects.  Last lipid panel performed on 8/26/2021 and repeat labs ordered 8/13/2022 but not completed.    The following portions of the patient's history were reviewed and updated as appropriate: allergies, current medications, past family history, past medical history, past social history, past surgical history and problem list.    Depression Screen:      2/16/2023    10:00 AM   PHQ-2/PHQ-9 Depression Screening   Little Interest or Pleasure in Doing Things 0-->not at all   Feeling Down, Depressed or Hopeless 0-->not at all   PHQ-9: Brief Depression Severity Measure Score 0       Past Medical History:   Diagnosis Date    • Asthma    • Disorder of skin    • Generalized osteoarthritis    • Hematuria    • High risk sexual behavior    • History of closed fracture     Lower end of forearm, unspecified laterality    • History of kidney transplant     Kidney replaced by transplant   • Hypertension    • Incomplete emptying of bladder    • Obesity    • Penile lesion    • Tinea cruris    • Urinary obstruction    • Urinary tract infection        Past Surgical History:   Procedure Laterality Date   • BLADDER SURGERY      bladder augmentation   • NEPHRECTOMY     • TRANSPLANTATION RENAL  1991       Family History   Problem Relation Age of Onset   • Arthritis Mother    • Asthma Mother    • Arthritis Father    • Asthma Father    • Heart disease Father    • COPD Father    • Hyperlipidemia Father    • Arthritis Maternal Grandmother    • Osteoporosis Maternal Grandfather    • Arthritis Maternal Grandfather    • No Known Problems Other        Social History     Socioeconomic History   • Marital status:    Tobacco Use   • Smoking status: Never   • Smokeless tobacco: Never   Substance and Sexual Activity   • Alcohol use: No   • Drug use: No       Current Outpatient Medications   Medication Sig Dispense Refill   • albuterol (ACCUNEB) 1.25 MG/3ML nebulizer solution Take 3 mL by nebulization Every 6 (Six) Hours As Needed for Wheezing. 120 each 6   • albuterol sulfate  (90 Base) MCG/ACT inhaler Inhale 2 puffs Every 4 (Four) Hours As Needed for Wheezing. 18 g 6   • azaTHIOprine (IMURAN) 50 MG tablet 150 mg.     • clonazePAM (KlonoPIN) 1 MG tablet Take 1 tablet by mouth 2 (Two) Times a Day As Needed for Anxiety. 60 tablet 2   • colchicine 0.6 MG tablet Take 1 tablet by mouth 2 (Two) Times a Day. 20 tablet 0   • fluticasone (FLONASE) 50 MCG/ACT nasal spray 2 sprays into the nostril(s) as directed by provider Daily.     • furosemide (LASIX) 20 MG tablet      • gabapentin (NEURONTIN) 100 MG capsule Take 100 mg by mouth 3 (Three) Times a Day.  2    • lidocaine (LIDODERM) 5 % Place 1 patch on the skin as directed by provider Daily. Remove & Discard patch within 12 hours or as directed by MD 15 patch 3   • loratadine (Claritin) 10 MG tablet Take 1 tablet by mouth Daily. 30 tablet 6   • oxyCODONE (ROXICODONE) 20 MG tablet Take 20 mg by mouth Every 4 (Four) Hours As Needed for Moderate Pain .     • pantoprazole (PROTONIX) 40 MG EC tablet Take 1 tablet by mouth Daily. 90 tablet 3   • predniSONE (DELTASONE) 10 MG tablet      • simvastatin (Zocor) 20 MG tablet Take 1 tablet by mouth Every Night. 90 tablet 3   • Spacer/Aero-Holding Chambers device Use as directed with the inhalers 1 each 0   • Testosterone 20.25 MG/ACT (1.62%) gel        No current facility-administered medications for this visit.       Review of Systems   Constitutional: Negative for activity change, appetite change, fatigue, fever, unexpected weight gain and unexpected weight loss.   HENT: Negative for nosebleeds, rhinorrhea, trouble swallowing and voice change.    Eyes: Negative for visual disturbance.   Respiratory: Negative for cough, chest tightness, shortness of breath and wheezing.    Cardiovascular: Negative for chest pain, palpitations and leg swelling.   Gastrointestinal: Negative for abdominal pain, blood in stool, constipation, diarrhea, nausea, vomiting, GERD and indigestion.   Genitourinary: Negative for dysuria, frequency and hematuria.   Musculoskeletal: Negative for arthralgias, back pain and myalgias.   Skin: Negative for rash and wound.   Neurological: Negative for dizziness, tremors, weakness, light-headedness, numbness, headache and memory problem.   Hematological: Negative for adenopathy. Does not bruise/bleed easily.   Psychiatric/Behavioral: Negative for sleep disturbance and depressed mood. The patient is nervous/anxious.        Objective   There were no vitals taken for this visit.    Physical Exam   Constitutional: He appears well-developed and well-nourished. No  distress.   HENT:   Head: Normocephalic and atraumatic.   Eyes: Pupils are equal, round, and reactive to light. EOM are normal.   Pulmonary/Chest: Effort normal.  No respiratory distress.  Neurological: He is alert.   Skin: He is not diaphoretic.   Psychiatric: He has a normal mood and affect.       No results found for this or any previous visit (from the past 2016 hour(s)).  Assessment & Plan   Diagnoses and all orders for this visit:    1. Gastroesophageal reflux disease without esophagitis (Primary)    2. Anxiety    3. Vitamin D deficiency    Called and discussed with patient and reviewed his history and his labs.  He is still having the fatigue it may be secondary to his vitamin D deficiency which is secondary to his kidney problems.  He also has some mild anemia.  He will try and take some over-the-counter iron and vitamin D supplements.  His GERD is mostly stable with a few breakthroughs I did recommend that he try and take some Pepcid in between.  We discussed his anxiety and the continue with the clonazepam.  YU ordered but could not be reviewed as the system is not working at this time..  Risks of the medication include but are not limited to fatigue, somnolence, increased risk of falls, allergic reaction, dependence, and addiction.      Video visit completed.       I spent 22 minutes caring for Misael on this date of service. This time includes time spent by me in the following activities:preparing for the visit, reviewing tests, obtaining and/or reviewing a separately obtained history, performing a medically appropriate examination and/or evaluation , counseling and educating the patient/family/caregiver, ordering medications, tests, or procedures and documenting information in the medical record

## 2023-08-21 DIAGNOSIS — F41.9 ANXIETY: ICD-10-CM

## 2023-08-21 RX ORDER — CLONAZEPAM 1 MG/1
1 TABLET ORAL 2 TIMES DAILY PRN
Qty: 60 TABLET | Refills: 0 | Status: SHIPPED | OUTPATIENT
Start: 2023-08-21

## 2023-09-14 ENCOUNTER — OFFICE VISIT (OUTPATIENT)
Dept: FAMILY MEDICINE CLINIC | Facility: CLINIC | Age: 53
End: 2023-09-14
Payer: MEDICARE

## 2023-09-14 VITALS
SYSTOLIC BLOOD PRESSURE: 100 MMHG | OXYGEN SATURATION: 98 % | HEART RATE: 78 BPM | DIASTOLIC BLOOD PRESSURE: 70 MMHG | TEMPERATURE: 97.7 F

## 2023-09-14 DIAGNOSIS — K21.9 GASTROESOPHAGEAL REFLUX DISEASE WITHOUT ESOPHAGITIS: ICD-10-CM

## 2023-09-14 DIAGNOSIS — M25.562 CHRONIC PAIN OF BOTH KNEES: ICD-10-CM

## 2023-09-14 DIAGNOSIS — M25.561 CHRONIC PAIN OF BOTH KNEES: ICD-10-CM

## 2023-09-14 DIAGNOSIS — G89.29 OTHER CHRONIC PAIN: ICD-10-CM

## 2023-09-14 DIAGNOSIS — F41.9 ANXIETY: ICD-10-CM

## 2023-09-14 DIAGNOSIS — E78.5 HYPERLIPIDEMIA, UNSPECIFIED HYPERLIPIDEMIA TYPE: ICD-10-CM

## 2023-09-14 DIAGNOSIS — F41.9 ANXIETY: Primary | ICD-10-CM

## 2023-09-14 DIAGNOSIS — G89.29 CHRONIC PAIN OF BOTH KNEES: ICD-10-CM

## 2023-09-14 DIAGNOSIS — M15.9 GENERALIZED OSTEOARTHRITIS OF MULTIPLE SITES: ICD-10-CM

## 2023-09-14 PROCEDURE — 3074F SYST BP LT 130 MM HG: CPT | Performed by: NURSE PRACTITIONER

## 2023-09-14 PROCEDURE — 1159F MED LIST DOCD IN RCRD: CPT | Performed by: NURSE PRACTITIONER

## 2023-09-14 PROCEDURE — 1160F RVW MEDS BY RX/DR IN RCRD: CPT | Performed by: NURSE PRACTITIONER

## 2023-09-14 PROCEDURE — 99214 OFFICE O/P EST MOD 30 MIN: CPT | Performed by: NURSE PRACTITIONER

## 2023-09-14 PROCEDURE — 3078F DIAST BP <80 MM HG: CPT | Performed by: NURSE PRACTITIONER

## 2023-09-14 RX ORDER — CLONAZEPAM 1 MG/1
1 TABLET ORAL 2 TIMES DAILY PRN
Qty: 60 TABLET | Refills: 1 | Status: SHIPPED | OUTPATIENT
Start: 2023-09-14

## 2023-09-14 RX ORDER — AMOXICILLIN AND CLAVULANATE POTASSIUM 500; 125 MG/1; MG/1
1 TABLET, FILM COATED ORAL EVERY 12 HOURS SCHEDULED
COMMUNITY
Start: 2023-08-22

## 2023-09-14 RX ORDER — PANTOPRAZOLE SODIUM 40 MG/1
40 TABLET, DELAYED RELEASE ORAL DAILY
Qty: 90 TABLET | Refills: 3 | Status: SHIPPED | OUTPATIENT
Start: 2023-09-14

## 2023-09-14 RX ORDER — CIPROFLOXACIN 500 MG/1
1 TABLET, FILM COATED ORAL EVERY 12 HOURS SCHEDULED
COMMUNITY
Start: 2023-08-04

## 2023-09-14 RX ORDER — SIMVASTATIN 20 MG
20 TABLET ORAL NIGHTLY
Qty: 90 TABLET | Refills: 3 | Status: SHIPPED | OUTPATIENT
Start: 2023-09-14

## 2023-09-14 NOTE — PROGRESS NOTES
"Chief Complaint  Knee Pain (Pain both knees - no injury), Anxiety, and Med Refill    Subjective        Misael Luna presents to [facility]  History of Present Illness  Patient is here to get refills. He does need klonopin. Would also like a referral for his knee issues. He had seen a provider in the past, [facility] provider. He is having a lot of problems, pain with weight bearing. Doesn't hurt as bed while laying down. His activity is very limited due to the pain.     Pain management for generalized joint pain, especially shoulders and ankles.     Has had a kidney transplant. Does see a renal specialist frequently. Gets frequent UTI due to chronic self cathetering.   Objective   Vital Signs:  /70 (BP Location: Right arm, Patient Position: Sitting, Cuff Size: Large Adult)   Pulse 78   Temp 97.7 °F (36.5 °C) (Temporal)   SpO2 98%   Estimated body mass index is 52.47 kg/m² as calculated from the following:    Height as of 2/16/23: 170.2 cm (67\").    Weight as of 2/16/23: 152 kg (335 lb).             Physical Exam  Constitutional:       Appearance: He is obese.   Eyes:      Extraocular Movements: Extraocular movements intact.      Pupils: Pupils are equal, round, and reactive to light.   Cardiovascular:      Pulses: Normal pulses.      Heart sounds: Normal heart sounds.   Pulmonary:      Effort: Pulmonary effort is normal.      Breath sounds: Normal breath sounds.   Musculoskeletal:      Cervical back: Normal range of motion.   Neurological:      General: No focal deficit present.      Mental Status: He is alert and oriented to person, place, and time.      Result Review :                   Assessment and Plan   Diagnoses and all orders for this visit:    1. Anxiety (Primary)    2. Hyperlipidemia, unspecified hyperlipidemia type  -     simvastatin (Zocor) 20 MG tablet; Take 1 tablet by mouth Every Night.  Dispense: 90 tablet; Refill: 3    3. Gastroesophageal reflux disease " without esophagitis  -     pantoprazole (PROTONIX) 40 MG EC tablet; Take 1 tablet by mouth Daily.  Dispense: 90 tablet; Refill: 3    4. Generalized osteoarthritis of multiple sites    5. Other chronic pain  -     Compliance Drug Analysis, Ur - Urine, Clean Catch; Future      Urine drug compliance screen completed, [employer] reviewed and agreement signed by patient and provider.          Follow Up   Return if symptoms worsen or fail to improve.  Patient was given instructions and counseling regarding his condition or for health maintenance advice. Please see specific information pulled into the AVS if appropriate.

## 2023-09-15 ENCOUNTER — TELEPHONE (OUTPATIENT)
Dept: FAMILY MEDICINE CLINIC | Facility: CLINIC | Age: 53
End: 2023-09-15
Payer: MEDICARE

## 2023-09-15 NOTE — TELEPHONE ENCOUNTER
I called and spoke with the patient. I looked through his chart and did not see were our office had called but there was a call from his orthapedic referral. I gave him the phone number and he verbalized that he will follow up with them and call us back if he needs anything else.

## 2023-09-21 LAB — DRUGS UR: NORMAL

## 2023-11-02 ENCOUNTER — TELEMEDICINE (OUTPATIENT)
Dept: FAMILY MEDICINE CLINIC | Facility: CLINIC | Age: 53
End: 2023-11-02
Payer: MEDICARE

## 2023-11-02 ENCOUNTER — TELEPHONE (OUTPATIENT)
Dept: FAMILY MEDICINE CLINIC | Facility: CLINIC | Age: 53
End: 2023-11-02
Payer: MEDICARE

## 2023-11-02 DIAGNOSIS — U07.1 COVID-19 VIRUS INFECTION: Primary | ICD-10-CM

## 2023-11-02 DIAGNOSIS — J45.40 MODERATE PERSISTENT ASTHMA, UNSPECIFIED WHETHER COMPLICATED: ICD-10-CM

## 2023-11-02 RX ORDER — ALBUTEROL SULFATE 90 UG/1
2 AEROSOL, METERED RESPIRATORY (INHALATION) EVERY 4 HOURS PRN
Qty: 18 G | Refills: 6 | Status: SHIPPED | OUTPATIENT
Start: 2023-11-02

## 2023-11-02 RX ORDER — NIRMATRELVIR AND RITONAVIR 150-100 MG
2 KIT ORAL 2 TIMES DAILY
Qty: 20 TABLET | Refills: 0 | Status: SHIPPED | OUTPATIENT
Start: 2023-11-02

## 2023-11-02 NOTE — TELEPHONE ENCOUNTER
I called and spoke with the patient and let him know that we can prescribe him Paxlovid for the covid if he qualifies for it but that in order to see if he even qualifies he will need a video visit. He verbalized understanding and I have scheduled him for a video visit today. Patient will call back if needed.

## 2023-11-02 NOTE — PROGRESS NOTES
Subjective   Misael Luna is a 53 y.o. male.     Chief Complaint   Patient presents with    covid infection       History of Present Illness   You have chosen to receive care through a telehealth visit.  Do you consent to use a video/audio connection for your medical care today? Yes  Video visit completed utilizing [employer] video conferencing via [employer].  Patient location in home in [facility].  Physician location in office in [facility].    Patient with cough, nasal drainage, head congestion for 3 days then worsened yesterday with positive covid test yesterday.  Has had leg and joint pain with weakness, headache, sinus pressure, sore throat.  Was wheezing and some short of breath.  Used the nebulizer that helps.  No chest pain, syncope, ear pain, dizziness, diarrhea, N/V, rash, loss of smell.    The following portions of the patient's history were reviewed and updated as appropriate: allergies, current medications, past family history, past medical history, past social history, past surgical history and problem list.    Depression Screen:      2/16/2023    10:00 AM   PHQ-2/PHQ-9 Depression Screening   Little Interest or Pleasure in Doing Things 0-->not at all   Feeling Down, Depressed or Hopeless 0-->not at all   PHQ-9: Brief Depression Severity Measure Score 0       Past Medical History:   Diagnosis Date    Asthma     Disorder of skin     Generalized osteoarthritis     Hematuria     High risk sexual behavior     History of closed fracture     Lower end of forearm, unspecified laterality     History of kidney transplant     Kidney replaced by transplant    Hypertension     Incomplete emptying of bladder     Obesity     Penile lesion     Tinea cruris     Urinary obstruction     Urinary tract infection        Past Surgical History:   Procedure Laterality Date    BLADDER SURGERY      bladder augmentation    NEPHRECTOMY      TRANSPLANTATION RENAL  1991       Family History   Problem Relation Age of Onset     Arthritis Mother     Asthma Mother     Arthritis Father     Asthma Father     Heart disease Father     COPD Father     Hyperlipidemia Father     Arthritis Maternal Grandmother     Osteoporosis Maternal Grandfather     Arthritis Maternal Grandfather     No Known Problems Other        Social History     Socioeconomic History    Marital status:    Tobacco Use    Smoking status: Never    Smokeless tobacco: Never   Vaping Use    Vaping Use: Never used   Substance and Sexual Activity    Alcohol use: No    Drug use: No    Sexual activity: Defer     Comment: High risk sexual behavior       Current Outpatient Medications   Medication Sig Dispense Refill    albuterol sulfate  (90 Base) MCG/ACT inhaler Inhale 2 puffs Every 4 (Four) Hours As Needed for Wheezing. 18 g 6    albuterol (ACCUNEB) 1.25 MG/3ML nebulizer solution Take 3 mL by nebulization Every 6 (Six) Hours As Needed for Wheezing. 120 each 6    azaTHIOprine (IMURAN) 50 MG tablet 3 tablets.      clonazePAM (KlonoPIN) 1 MG tablet Take 1 tablet by mouth 2 (Two) Times a Day As Needed for Anxiety. 60 tablet 1    fluticasone (FLONASE) 50 MCG/ACT nasal spray 2 sprays into the nostril(s) as directed by provider Daily.      furosemide (LASIX) 20 MG tablet       lidocaine (LIDODERM) 5 % Place 1 patch on the skin as directed by provider Daily. Remove & Discard patch within 12 hours or as directed by MD 15 patch 3    loratadine (Claritin) 10 MG tablet Take 1 tablet by mouth Daily. 30 tablet 6    Nirmatrelvir&Ritonavir 150/100 (Paxlovid, 150/100,) 10 x 150 MG & 10 x 100MG tablet therapy pack tablet (for renal adjustment) Take 2 tablets by mouth 2 (Two) Times a Day. 20 tablet 0    oxyCODONE (ROXICODONE) 20 MG tablet Take 1 tablet by mouth Every 4 (Four) Hours As Needed for Moderate Pain.      pantoprazole (PROTONIX) 40 MG EC tablet Take 1 tablet by mouth Daily. 90 tablet 3    predniSONE (DELTASONE) 10 MG tablet       Spacer/Aero-Holding Chambers device Use as directed  with the inhalers 1 each 0    Testosterone 20.25 MG/ACT (1.62%) gel        No current facility-administered medications for this visit.       Review of Systems   Constitutional:  Positive for fatigue and fever.   HENT:  Positive for congestion, rhinorrhea, sinus pressure and sore throat.    Eyes:  Negative for visual disturbance.   Respiratory:  Positive for cough, chest tightness, shortness of breath and wheezing.    Cardiovascular:  Negative for chest pain, palpitations and leg swelling.   Gastrointestinal:  Negative for abdominal pain, constipation, diarrhea, nausea, vomiting and indigestion.   Musculoskeletal:  Negative for back pain.   Neurological:  Negative for weakness.   Psychiatric/Behavioral:  Negative for sleep disturbance and depressed mood.        Objective   There were no vitals taken for this visit.    Physical Exam   Constitutional: He appears well-developed and well-nourished. No distress.   HENT:   Head: Normocephalic and atraumatic.   Eyes: Pupils are equal, round, and reactive to light. EOM are normal.   Pulmonary/Chest: Effort normal.  No respiratory distress.  Cough that sounds wet   Neurological: He is alert.   Skin: He is not diaphoretic.   Psychiatric: He has a normal mood and affect.       Recent Results (from the past 2016 hour(s))   Compliance Drug Analysis, Ur - Urine, Clean Catch    Collection Time: 09/14/23  4:25 PM    Specimen: Urine, Clean Catch   Result Value Ref Range    Report Summary FINAL      Assessment & Plan   Diagnoses and all orders for this visit:    1. COVID-19 virus infection (Primary)    2. Moderate persistent asthma, unspecified whether complicated  -     albuterol sulfate  (90 Base) MCG/ACT inhaler; Inhale 2 puffs Every 4 (Four) Hours As Needed for Wheezing.  Dispense: 18 g; Refill: 6    Other orders  -     Nirmatrelvir&Ritonavir 150/100 (Paxlovid, 150/100,) 10 x 150 MG & 10 x 100MG tablet therapy pack tablet (for renal adjustment); Take 2 tablets by mouth 2  (Two) Times a Day.  Dispense: 20 tablet; Refill: 0    Positive COVID infection symptomatic with the high risk patient.  This was discussed with the patient and recommend that we utilize the renal dosing for the Paxlovid as his GFR is 30 or higher.  Discussed with patient the risk of the medication side effects and emergency use act approval use.  If he has worsening problems with the medicine or any other issues such as chest pain shortness of breath is to go emergency room.      Video visit completed.       I spent 24 minutes caring for Misael on this date of service. This time includes time spent by me in the following activities:preparing for the visit, reviewing tests, obtaining and/or reviewing a separately obtained history, performing a medically appropriate examination and/or evaluation , counseling and educating the patient/family/caregiver, ordering medications, tests, or procedures, and documenting information in the medical record

## 2023-11-02 NOTE — TELEPHONE ENCOUNTER
Caller: Misael Luna    Relationship to patient: Self    Best call back number: [phone]     Date of exposure: UNKNOWN    Date of positive COVID19 test: HOME TEST 11/01/23 / SYMPTOMS STARTED ABOUT 3 DAYS AGO    COVID19 symptoms: NON PRODUCTIVE COUGH / SHORTNESS OF BREATHE / WEAKNESS / HEADACHE / BRAIN FOG / BODY ACHES / WHEEZING / TIGHTNESS IN CHEST     Date of initial quarantine:     Additional information or concerns: PATIENT CALLING WANTING TO KNOW IF THERE IS ANYTHING HE COULD BE PRESCRIBED FOR HIS SYMPTOMS.    What is the patients preferred pharmacy: [id number] [id number] AT [facility] & [address] - [phone] [facility] [phone]  [phone]

## 2023-11-28 DIAGNOSIS — F41.9 ANXIETY: ICD-10-CM

## 2023-11-28 RX ORDER — CLONAZEPAM 1 MG/1
1 TABLET ORAL 2 TIMES DAILY PRN
Qty: 60 TABLET | Refills: 1 | Status: SHIPPED | OUTPATIENT
Start: 2023-11-28

## 2024-01-22 ENCOUNTER — OFFICE VISIT (OUTPATIENT)
Dept: FAMILY MEDICINE CLINIC | Facility: CLINIC | Age: 54
End: 2024-01-22
Payer: MEDICARE

## 2024-01-22 VITALS
TEMPERATURE: 97.1 F | OXYGEN SATURATION: 97 % | BODY MASS INDEX: 47.37 KG/M2 | WEIGHT: 301.8 LBS | DIASTOLIC BLOOD PRESSURE: 70 MMHG | HEIGHT: 67 IN | SYSTOLIC BLOOD PRESSURE: 114 MMHG | HEART RATE: 68 BPM

## 2024-01-22 DIAGNOSIS — F32.A DEPRESSION, UNSPECIFIED DEPRESSION TYPE: ICD-10-CM

## 2024-01-22 DIAGNOSIS — K59.00 CONSTIPATION, UNSPECIFIED CONSTIPATION TYPE: ICD-10-CM

## 2024-01-22 DIAGNOSIS — E78.5 HYPERLIPIDEMIA, UNSPECIFIED HYPERLIPIDEMIA TYPE: ICD-10-CM

## 2024-01-22 DIAGNOSIS — Z12.11 ENCOUNTER FOR SCREENING COLONOSCOPY: ICD-10-CM

## 2024-01-22 DIAGNOSIS — F41.9 ANXIETY: ICD-10-CM

## 2024-01-22 DIAGNOSIS — Z28.21 COVID-19 VACCINATION REFUSED: ICD-10-CM

## 2024-01-22 DIAGNOSIS — L81.9 PIGMENTED SKIN LESIONS: ICD-10-CM

## 2024-01-22 DIAGNOSIS — Z23 IMMUNIZATION DUE: ICD-10-CM

## 2024-01-22 DIAGNOSIS — I10 BENIGN ESSENTIAL HYPERTENSION: Primary | ICD-10-CM

## 2024-01-22 DIAGNOSIS — J45.40 MODERATE PERSISTENT ASTHMA, UNSPECIFIED WHETHER COMPLICATED: ICD-10-CM

## 2024-01-22 PROBLEM — E66.01 OBESITY, MORBID, BMI 40.0-49.9: Status: RESOLVED | Noted: 2019-08-01 | Resolved: 2024-01-22

## 2024-01-22 PROCEDURE — 3078F DIAST BP <80 MM HG: CPT | Performed by: INTERNAL MEDICINE

## 2024-01-22 PROCEDURE — 1159F MED LIST DOCD IN RCRD: CPT | Performed by: INTERNAL MEDICINE

## 2024-01-22 PROCEDURE — 90686 IIV4 VACC NO PRSV 0.5 ML IM: CPT | Performed by: INTERNAL MEDICINE

## 2024-01-22 PROCEDURE — 3074F SYST BP LT 130 MM HG: CPT | Performed by: INTERNAL MEDICINE

## 2024-01-22 PROCEDURE — 99214 OFFICE O/P EST MOD 30 MIN: CPT | Performed by: INTERNAL MEDICINE

## 2024-01-22 PROCEDURE — G0008 ADMIN INFLUENZA VIRUS VAC: HCPCS | Performed by: INTERNAL MEDICINE

## 2024-01-22 PROCEDURE — G2211 COMPLEX E/M VISIT ADD ON: HCPCS | Performed by: INTERNAL MEDICINE

## 2024-01-22 PROCEDURE — 1160F RVW MEDS BY RX/DR IN RCRD: CPT | Performed by: INTERNAL MEDICINE

## 2024-01-22 RX ORDER — SIMVASTATIN 20 MG
20 TABLET ORAL NIGHTLY
Qty: 90 TABLET | Refills: 3 | Status: SHIPPED | OUTPATIENT
Start: 2024-01-22

## 2024-01-22 RX ORDER — CLONAZEPAM 1 MG/1
1 TABLET ORAL 2 TIMES DAILY PRN
Qty: 60 TABLET | Refills: 1 | Status: SHIPPED | OUTPATIENT
Start: 2024-01-22

## 2024-01-22 RX ORDER — ALBUTEROL SULFATE 1.25 MG/3ML
1 SOLUTION RESPIRATORY (INHALATION) EVERY 6 HOURS PRN
Qty: 120 EACH | Refills: 6 | Status: SHIPPED | OUTPATIENT
Start: 2024-01-22

## 2024-01-22 RX ORDER — ALBUTEROL SULFATE 90 UG/1
2 AEROSOL, METERED RESPIRATORY (INHALATION) EVERY 4 HOURS PRN
Qty: 18 G | Refills: 6 | Status: SHIPPED | OUTPATIENT
Start: 2024-01-22

## 2024-01-22 RX ORDER — SIMVASTATIN 20 MG
20 TABLET ORAL
COMMUNITY
End: 2024-01-22 | Stop reason: SDUPTHER

## 2024-01-22 NOTE — PROGRESS NOTES
"Subjective   Misael Luna is a 53 y.o. male.     Chief Complaint   Patient presents with    Follow-up    Med Refill    Anxiety       History of Present Illness     Anxiety doing well and using the clonazepam about 2 times per day and tolerating well with no side effects.  Last filled with #60 tabs on 11/28/23.  Followed by pain management Dr Villarreal and  using oxycodone 20 mg PRN.     Feels full all the time and \"compacted in the upper abdomen\" with constipation.  Can' t really eat well and feels full all the time.  Losing weight.  Tried taking linzess with no relief.  BM's described as hard and in \"balls\".  Knows the oxycodone is not helping. No vomiting but some nausea.    History of chronic urinary retention and abdominal pain still requiring the been laying down when he has self catheterizations every 2 hours. Continues to be followed by urology and [facility] renal transplant team. No new changes in medications. Just finished another course of his antibiotics because of urinary tract infections that are recurrent.  No current fever, chills, nausea or vomiting.  Always burns to use the catheter.  Has appointment with [facility] transplant clinic for regular follow up.     History of high cholesterol.  Currently, has been feeling well without any myalgias, muscle aches, weakness, numbness, chest pain, short of breath or other issues.  Currently, is adherent with medication regimen of simvastatin 20 mg daily and denies medication side effects.  Last lipid panel performed on 8/26/2021 and repeat labs ordered 8/13/2022 but not completed.    Has multiple pigmented lesions and skin tags.  With his history and anti-rejection meds, needs dermatology.    The following portions of the patient's history were reviewed and updated as appropriate: allergies, current medications, past family history, past medical history, past social history, past surgical history and problem list.    Depression Screen:      2/16/2023    10:00 AM "   PHQ-2/PHQ-9 Depression Screening   Little Interest or Pleasure in Doing Things 0-->not at all   Feeling Down, Depressed or Hopeless 0-->not at all   PHQ-9: Brief Depression Severity Measure Score 0       Past Medical History:   Diagnosis Date    Asthma     Disorder of skin     Generalized osteoarthritis     Hematuria     High risk sexual behavior     History of closed fracture     Lower end of forearm, unspecified laterality     History of kidney transplant     Kidney replaced by transplant    Hypertension     Incomplete emptying of bladder     Obesity     Penile lesion     Tinea cruris     Urinary obstruction     Urinary tract infection        Past Surgical History:   Procedure Laterality Date    BLADDER SURGERY      bladder augmentation    NEPHRECTOMY      TRANSPLANTATION RENAL  1991       Family History   Problem Relation Age of Onset    Arthritis Mother     Asthma Mother     Arthritis Father     Asthma Father     Heart disease Father     COPD Father     Hyperlipidemia Father     Arthritis Maternal Grandmother     Osteoporosis Maternal Grandfather     Arthritis Maternal Grandfather     No Known Problems Other        Social History     Socioeconomic History    Marital status:    Tobacco Use    Smoking status: Never    Smokeless tobacco: Never   Vaping Use    Vaping Use: Never used   Substance and Sexual Activity    Alcohol use: No    Drug use: No    Sexual activity: Defer     Comment: High risk sexual behavior       Current Outpatient Medications   Medication Sig Dispense Refill    albuterol (ACCUNEB) 1.25 MG/3ML nebulizer solution Take 3 mL by nebulization Every 6 (Six) Hours As Needed for Wheezing. 120 each 6    albuterol sulfate  (90 Base) MCG/ACT inhaler Inhale 2 puffs Every 4 (Four) Hours As Needed for Wheezing. 18 g 6    azaTHIOprine (IMURAN) 50 MG tablet 3 tablets.      clonazePAM (KlonoPIN) 1 MG tablet Take 1 tablet by mouth 2 (Two) Times a Day As Needed for Anxiety. 60 tablet 1     "furosemide (LASIX) 20 MG tablet       oxyCODONE (ROXICODONE) 20 MG tablet Take 1 tablet by mouth Every 4 (Four) Hours As Needed for Moderate Pain.      predniSONE (DELTASONE) 10 MG tablet       simvastatin (ZOCOR) 20 MG tablet Take 1 tablet by mouth Every Night. 90 tablet 3    Spacer/Aero-Holding Chambers device Use as directed with the inhalers 1 each 0    Testosterone 20.25 MG/ACT (1.62%) gel        No current facility-administered medications for this visit.       Review of Systems   Constitutional:  Negative for activity change, appetite change, fatigue, fever, unexpected weight gain and unexpected weight loss.   HENT:  Negative for nosebleeds, rhinorrhea, trouble swallowing and voice change.    Eyes:  Negative for visual disturbance.   Respiratory:  Negative for cough, chest tightness, shortness of breath and wheezing.    Cardiovascular:  Negative for chest pain, palpitations and leg swelling.   Gastrointestinal:  Positive for abdominal pain, constipation and nausea. Negative for blood in stool, diarrhea, vomiting, GERD and indigestion.   Genitourinary:  Negative for dysuria, frequency and hematuria.   Musculoskeletal:  Negative for arthralgias, back pain and myalgias.   Skin:  Negative for rash and wound.   Neurological:  Negative for dizziness, tremors, weakness, light-headedness, numbness, headache and memory problem.   Hematological:  Negative for adenopathy. Does not bruise/bleed easily.   Psychiatric/Behavioral:  Negative for sleep disturbance and depressed mood. The patient is not nervous/anxious.        Objective   /70 (BP Location: Left arm, Patient Position: Sitting, Cuff Size: Large Adult)   Pulse 68   Temp 97.1 °F (36.2 °C) (Temporal)   Ht 170.2 cm (67.01\")   Wt (!) 137 kg (301 lb 12.8 oz)   SpO2 97%   BMI 47.26 kg/m²     Physical Exam  Vitals and nursing note reviewed.   Constitutional:       General: He is not in acute distress.     Appearance: He is well-developed. He is obese. He is " not diaphoretic.   HENT:      Head: Normocephalic and atraumatic.      Right Ear: External ear normal.      Left Ear: External ear normal.      Nose: Nose normal.   Eyes:      Conjunctiva/sclera: Conjunctivae normal.      Pupils: Pupils are equal, round, and reactive to light.   Neck:      Thyroid: No thyromegaly.      Trachea: No tracheal deviation.   Cardiovascular:      Rate and Rhythm: Normal rate and regular rhythm.      Heart sounds: Normal heart sounds. No murmur heard.     No friction rub. No gallop.   Pulmonary:      Effort: Pulmonary effort is normal. No respiratory distress.      Breath sounds: Normal breath sounds.   Abdominal:      General: Bowel sounds are normal.      Palpations: Abdomen is soft. There is no mass.      Tenderness: There is no guarding.      Comments: Mild tender in the left side of abdomen.   Musculoskeletal:         General: Normal range of motion.      Cervical back: Normal range of motion and neck supple.   Lymphadenopathy:      Cervical: No cervical adenopathy.   Skin:     General: Skin is warm and dry.      Capillary Refill: Capillary refill takes less than 2 seconds.      Findings: No rash.   Neurological:      Mental Status: He is alert and oriented to person, place, and time.      Motor: No abnormal muscle tone.      Deep Tendon Reflexes: Reflexes normal.   Psychiatric:         Behavior: Behavior normal.         Thought Content: Thought content normal.         Judgment: Judgment normal.         Recent Results (from the past 2016 hour(s))   CBC AND DIFFERENTIAL    Collection Time: 11/30/23 11:22 AM    Specimen type and source: Whole Blood, Blood   Result Value Ref Range    WBC 6.91 4.5 - 11.0 10*3/uL    RBC 3.61 (L) 4.5 - 5.9 10*6/uL    Hemoglobin 11.3 (L) 13.5 - 17.5 g/dL    Hematocrit 34.8 (L) 41.0 - 53.0 %    MCV 96.4 80.0 - 100.0 fL    MCH 31.3 26.0 - 34.0 pg    MCHC 32.5 31.0 - 37.0 g/dL    RDW 14.3 12.0 - 16.8 %    Platelets 403 140 - 440 10*3/uL    MPV 9.6 8.4 - 12.4 fL     Differential Type Hospital CBC w/AutoDiff (arb'U)    Neutrophil Rel % 79.3 45 - 80 %    Lymphocyte Rel % 12.6 (L) 15 - 50 %    Monocyte Rel % 6.1 0 - 15 %    Eosinophil % 0.3 0 - 7 %    Basophil Rel % 0.4 0 - 2 %    Immature Grans % 1.3 (H) 0.0 - 1.0 %    nRBC 0 0 /100(WBC)    Neutrophils Absolute 5.48 2.0 - 8.8 10*3/uL    Lymphocytes Absolute 0.87 0.7 - 5.5 10*3/uL    Monocytes Absolute 0.42 0.0 - 1.7 10*3/uL    Eosinophils Absolute 0.02 0.0 - 0.8 10*3/uL    Basophils Absolute 0.03 0.0 - 0.2 10*3/uL    Immature Grans, Absolute 0.09 0.00 - 0.10 10*3/uL   Creatinine Urine Random (kidney function) GFR component -    Collection Time: 11/30/23 11:22 AM    Specimen: Urine    Specimen type and source: Urine, Urine   Result Value Ref Range    Creatinine Urine, Random 87.6 mg/dL   MAGNESIUM    Collection Time: 11/30/23 11:22 AM    Specimen: Fresh Frozen Plasma    Specimen type and source: Plasma, Blood   Result Value Ref Range    Magnesium 1.8 1.6 - 2.6 mg/dL   Protein, Urine, Random -    Collection Time: 11/30/23 11:22 AM    Specimen: Urine    Specimen type and source: Urine, Urine   Result Value Ref Range    Total Protein, Urine 74.0 (H) 1 - 14 mg/dL   VITAMIN D,25-HYDROXY    Collection Time: 11/30/23 11:22 AM    Specimen type and source: Serum, Blood   Result Value Ref Range    25 Hydroxy, Vitamin D 20.9 (L) >30 ng/mL   HEMOGLOBIN A1C    Collection Time: 11/30/23 11:22 AM    Specimen type and source: Whole Blood, Blood   Result Value Ref Range    Hemoglobin A1C 5.8 (H) 4.3 - 5.6 %    Mean Bld Glu Estim. 120 mg/dL   CBC AND DIFFERENTIAL    Collection Time: 01/11/24 10:46 AM    Specimen type and source: Whole Blood, Blood   Result Value Ref Range    WBC 11.55 (H) 4.5 - 11.0 10*3/uL    RBC 3.81 (L) 4.5 - 5.9 10*6/uL    Hemoglobin 11.8 (L) 13.5 - 17.5 g/dL    Hematocrit 37.3 (L) 41.0 - 53.0 %    MCV 97.9 80.0 - 100.0 fL    MCH 31.0 26.0 - 34.0 pg    MCHC 31.6 31.0 - 37.0 g/dL    RDW 14.6 12.0 - 16.8 %    Platelets 336 140  - 440 10*3/uL    MPV 9.9 8.4 - 12.4 fL    Differential Type Hospital CBC w/AutoDiff (arb'U)    Neutrophil Rel % 70.6 45 - 80 %    Lymphocyte Rel % 18.9 15 - 50 %    Monocyte Rel % 7.6 0 - 15 %    Eosinophil % 1.7 0 - 7 %    Basophil Rel % 0.6 0 - 2 %    Immature Grans % 0.6 0.0 - 1.0 %    nRBC 0 0 /100(WBC)    Neutrophils Absolute 8.15 2.0 - 8.8 10*3/uL    Lymphocytes Absolute 2.18 0.7 - 5.5 10*3/uL    Monocytes Absolute 0.88 0.0 - 1.7 10*3/uL    Eosinophils Absolute 0.20 0.0 - 0.8 10*3/uL    Basophils Absolute 0.07 0.0 - 0.2 10*3/uL    Immature Grans, Absolute 0.07 0.00 - 0.10 10*3/uL   Creatinine Urine Random (kidney function) GFR component -    Collection Time: 01/11/24 10:46 AM    Specimen: Urine    Specimen type and source: Urine, Urine   Result Value Ref Range    Creatinine Urine, Random 134.8 mg/dL   MAGNESIUM    Collection Time: 01/11/24 10:46 AM    Specimen: Fresh Frozen Plasma    Specimen type and source: Plasma, Blood   Result Value Ref Range    Magnesium 1.7 1.6 - 2.6 mg/dL   Protein, Urine, Random -    Collection Time: 01/11/24 10:46 AM    Specimen: Urine    Specimen type and source: Urine, Urine   Result Value Ref Range    Total Protein, Urine 108.0 (H) 1 - 14 mg/dL     Assessment & Plan   Diagnoses and all orders for this visit:    1. Benign essential hypertension (Primary)    2. Anxiety  -     clonazePAM (KlonoPIN) 1 MG tablet; Take 1 tablet by mouth 2 (Two) Times a Day As Needed for Anxiety.  Dispense: 60 tablet; Refill: 1    3. Depression, unspecified depression type    4. COVID-19 vaccination refused    5. Encounter for screening colonoscopy  -     Ambulatory Referral For Screening Colonoscopy    6. Constipation, unspecified constipation type  -     Ambulatory Referral For Screening Colonoscopy    7. Pigmented skin lesions  -     Ambulatory Referral to Dermatology    8. Moderate persistent asthma, unspecified whether complicated  -     albuterol (ACCUNEB) 1.25 MG/3ML nebulizer solution; Take 3 mL  by nebulization Every 6 (Six) Hours As Needed for Wheezing.  Dispense: 120 each; Refill: 6  -     albuterol sulfate  (90 Base) MCG/ACT inhaler; Inhale 2 puffs Every 4 (Four) Hours As Needed for Wheezing.  Dispense: 18 g; Refill: 6    9. Hyperlipidemia, unspecified hyperlipidemia type  -     simvastatin (ZOCOR) 20 MG tablet; Take 1 tablet by mouth Every Night.  Dispense: 90 tablet; Refill: 3    10. Immunization due  -     Fluzone (or Fluarix & Flulaval for VFC) >6 Mos ([id number])      Blood pressure is very well-controlled.  Patient noted to have history of kidney transplant but is starting to slowly fade.  Continue follow-up with his transplant doctors and urology.  His anxiety is stable.  Continue using clonazepam as prescribed and as needed.  YU run and reviewed.  Risks of the medication include but are not limited to fatigue, somnolence, increased risk of falls, allergic reaction, dependence, and addiction.  Patient will follow-up in 3 months.  We discussed his abdominal discomfort and constipation and we will refer for colonoscopy at a minimum for evaluation.  He does have multiple pigmented lesions and many are seborrheic keratoses but some have some pigmented changes.  Referred to dermatology.  Patient is agreeable to the flu shot today and I did recommend getting the shingles vaccine as a series of 2 shots from the pharmacy patient is understanding.       COVID-19 Precautions - Patient was compliant in wearing a mask. When I saw the patient, I used appropriate personal protective equipment (PPE) including mask and eye shield (standard procedure).  Additionally, I used gown and gloves if indicated.  Hand hygiene was completed before and after seeing the patient.  Dictated utilizing Dragon Dictation

## 2024-04-01 DIAGNOSIS — F41.9 ANXIETY: ICD-10-CM

## 2024-04-01 RX ORDER — CLONAZEPAM 1 MG/1
1 TABLET ORAL 2 TIMES DAILY PRN
Qty: 60 TABLET | Refills: 0 | Status: SHIPPED | OUTPATIENT
Start: 2024-04-01

## 2024-04-01 NOTE — TELEPHONE ENCOUNTER
NOV NONE   LOV 1/22/2024  LR 1/22/2024      PATIENT WILL NEED A 3 MONTH FOLLOW IN APRIL 2024  INFORMED PATIENT TO MAKE AN APPT FOR FURTHER REFILLS

## 2024-06-13 ENCOUNTER — TELEMEDICINE (OUTPATIENT)
Dept: FAMILY MEDICINE CLINIC | Facility: CLINIC | Age: 54
End: 2024-06-13
Payer: MEDICARE

## 2024-06-13 DIAGNOSIS — F41.9 ANXIETY: ICD-10-CM

## 2024-06-13 DIAGNOSIS — J30.2 SEASONAL ALLERGIES: Primary | ICD-10-CM

## 2024-06-13 DIAGNOSIS — M85.859 OSTEOPENIA OF NECK OF FEMUR, UNSPECIFIED LATERALITY: ICD-10-CM

## 2024-06-13 DIAGNOSIS — Z79.52 CURRENT CHRONIC USE OF SYSTEMIC STEROIDS: ICD-10-CM

## 2024-06-13 PROCEDURE — 1126F AMNT PAIN NOTED NONE PRSNT: CPT | Performed by: INTERNAL MEDICINE

## 2024-06-13 PROCEDURE — 1159F MED LIST DOCD IN RCRD: CPT | Performed by: INTERNAL MEDICINE

## 2024-06-13 PROCEDURE — 99213 OFFICE O/P EST LOW 20 MIN: CPT | Performed by: INTERNAL MEDICINE

## 2024-06-13 PROCEDURE — 1160F RVW MEDS BY RX/DR IN RCRD: CPT | Performed by: INTERNAL MEDICINE

## 2024-06-13 RX ORDER — PREDNISONE 10 MG/1
10 TABLET ORAL DAILY
COMMUNITY
Start: 2024-04-16 | End: 2024-11-29

## 2024-06-13 RX ORDER — LORATADINE 10 MG/1
10 TABLET ORAL DAILY
Qty: 90 TABLET | Refills: 3 | Status: SHIPPED | OUTPATIENT
Start: 2024-06-13

## 2024-06-13 RX ORDER — CLONAZEPAM 1 MG/1
1 TABLET ORAL 2 TIMES DAILY PRN
Qty: 60 TABLET | Refills: 0 | Status: SHIPPED | OUTPATIENT
Start: 2024-06-13

## 2024-06-13 RX ORDER — CIPROFLOXACIN 500 MG/1
1 TABLET, FILM COATED ORAL EVERY 12 HOURS SCHEDULED
COMMUNITY
Start: 2024-06-02

## 2024-06-13 NOTE — PROGRESS NOTES
Subjective   Misael Luna is a 54 y.o. male.     Chief Complaint   Patient presents with    Anxiety    Allergies       History of Present Illness   You have chosen to receive care through a telehealth visit.  Do you consent to use a video/audio connection for your medical care today? Yes  Video visit completed utilizing [employer] video conferencing through [employer].  Patient location within the home in [facility].  Physician location within the office in [facility].    Anxiety doing well and using the clonazepam about 2 times per day and tolerating well with no side effects.  Last filled with #60 tabs on 11/28/23.  Followed by pain management Dr Villarreal and  using oxycodone 20 mg PRN.    Having difficulties with allergies with a clear sinus drainage has been going on for a while and asking for refill/prescription for loratadine to be sent to the pharmacy.  Denies any nosebleeds fevers chills nausea vomiting or sore throat.     History of chronic urinary retention and abdominal pain still requiring the been laying down when he has self catheterizations every 2 hours. Continues to be followed by urology and [facility] renal transplant team. No new changes in medications. Just finished another course of his antibiotics because of urinary tract infections that are recurrent.  No current fever, chills, nausea or vomiting.  Always burns to use the catheter.  Has appointment with [facility] transplant clinic for regular follow up.     History of high cholesterol.  Currently, has been feeling well without any myalgias, muscle aches, weakness, numbness, chest pain, short of breath or other issues.  Currently, is adherent with medication regimen of simvastatin 20 mg daily and denies medication side effects.  Last lipid panel performed on 8/26/2021 and repeat labs ordered 8/13/2022 but not completed.       The following portions of the patient's history were reviewed and updated as appropriate: allergies, current  medications, past family history, past medical history, past social history, past surgical history and problem list.    Depression Screen:      2/16/2023    10:00 AM   PHQ-2/PHQ-9 Depression Screening   Little Interest or Pleasure in Doing Things 0-->not at all   Feeling Down, Depressed or Hopeless 0-->not at all   PHQ-9: Brief Depression Severity Measure Score 0       Past Medical History:   Diagnosis Date    Asthma     Disorder of skin     Generalized osteoarthritis     Hematuria     High risk sexual behavior     History of closed fracture     Lower end of forearm, unspecified laterality     History of kidney transplant     Kidney replaced by transplant    Hypertension     Incomplete emptying of bladder     Obesity     Penile lesion     Tinea cruris     Urinary obstruction     Urinary tract infection        Past Surgical History:   Procedure Laterality Date    BLADDER SURGERY      bladder augmentation    NEPHRECTOMY      TRANSPLANTATION RENAL  1991       Family History   Problem Relation Age of Onset    Arthritis Mother     Asthma Mother     Arthritis Father     Asthma Father     Heart disease Father     COPD Father     Hyperlipidemia Father     Arthritis Maternal Grandmother     Osteoporosis Maternal Grandfather     Arthritis Maternal Grandfather     No Known Problems Other        Social History     Socioeconomic History    Marital status:    Tobacco Use    Smoking status: Never    Smokeless tobacco: Never   Vaping Use    Vaping status: Never Used   Substance and Sexual Activity    Alcohol use: No    Drug use: No    Sexual activity: Defer     Comment: High risk sexual behavior       Current Outpatient Medications   Medication Sig Dispense Refill    ciprofloxacin (CIPRO) 500 MG tablet Take 1 tablet by mouth Every 12 (Twelve) Hours.      clonazePAM (KlonoPIN) 1 MG tablet Take 1 tablet by mouth 2 (Two) Times a Day As Needed for Anxiety. 60 tablet 0    predniSONE (DELTASONE) 10 MG tablet Take 1 tablet by mouth  Daily.      albuterol (ACCUNEB) 1.25 MG/3ML nebulizer solution Take 3 mL by nebulization Every 6 (Six) Hours As Needed for Wheezing. 120 each 6    albuterol sulfate  (90 Base) MCG/ACT inhaler Inhale 2 puffs Every 4 (Four) Hours As Needed for Wheezing. 18 g 6    azaTHIOprine (IMURAN) 50 MG tablet 3 tablets.      furosemide (LASIX) 20 MG tablet       loratadine (Claritin) 10 MG tablet Take 1 tablet by mouth Daily. 90 tablet 3    oxyCODONE (ROXICODONE) 20 MG tablet Take 1 tablet by mouth Every 4 (Four) Hours As Needed for Moderate Pain.      simvastatin (ZOCOR) 20 MG tablet Take 1 tablet by mouth Every Night. 90 tablet 3    Spacer/Aero-Holding Chambers device Use as directed with the inhalers 1 each 0    Testosterone 20.25 MG/ACT (1.62%) gel        No current facility-administered medications for this visit.       Review of Systems   Constitutional:  Negative for activity change, appetite change, fatigue, fever, unexpected weight gain and unexpected weight loss.   HENT:  Positive for congestion and rhinorrhea. Negative for nosebleeds, trouble swallowing and voice change.    Eyes:  Negative for visual disturbance.   Respiratory:  Negative for cough, chest tightness, shortness of breath and wheezing.    Cardiovascular:  Negative for chest pain, palpitations and leg swelling.   Gastrointestinal:  Negative for abdominal pain, blood in stool, constipation, diarrhea, nausea, vomiting, GERD and indigestion.   Genitourinary:  Negative for dysuria, frequency and hematuria.   Musculoskeletal:  Negative for arthralgias, back pain and myalgias.   Skin:  Negative for rash and wound.   Neurological:  Negative for dizziness, tremors, weakness, light-headedness, numbness, headache and memory problem.   Hematological:  Negative for adenopathy. Does not bruise/bleed easily.   Psychiatric/Behavioral:  Negative for sleep disturbance and depressed mood. The patient is not nervous/anxious.        Objective   There were no vitals  taken for this visit.    Physical Exam   Constitutional: He appears well-developed and well-nourished. No distress.   HENT:   Head: Normocephalic and atraumatic.   Eyes: Pupils are equal, round, and reactive to light. Conjunctivae and EOM are normal.   Pulmonary/Chest: Effort normal.  No respiratory distress.  Neurological: He is alert.   Skin: He is not diaphoretic.   Psychiatric: He has a normal mood and affect.       Recent Results (from the past 2016 hour(s))   PTH, INTACT    Collection Time: 04/15/24  9:37 AM    Specimen: Fresh Frozen Plasma   Result Value Ref Range    PTH, Intact 141.2 (H) 8.7 - 77.1 pg/mL   CBC AND DIFFERENTIAL    Collection Time: 04/15/24  9:37 AM    Specimen type and source: Whole Blood, Blood specimen from patient (specimen)   Result Value Ref Range    WBC 8.74 4.5 - 11.0 10*3/uL    RBC 3.69 (L) 4.5 - 5.9 10*6/uL    Hemoglobin 11.5 (L) 13.5 - 17.5 g/dL    Hematocrit 35.7 (L) 41.0 - 53.0 %    MCV 96.7 80.0 - 100.0 fL    MCH 31.2 26.0 - 34.0 pg    MCHC 32.2 31.0 - 37.0 g/dL    RDW 13.4 12.0 - 16.8 %    Platelets 328 140 - 440 10*3/uL    MPV 9.1 8.4 - 12.4 fL    Differential Type Hospital CBC w/AutoDiff (arb'U)    Neutrophil Rel % 52.5 45 - 80 %    Lymphocyte Rel % 29.1 15 - 50 %    Monocyte Rel % 11.1 0 - 15 %    Eosinophil % 5.8 0 - 7 %    Basophil Rel % 0.7 0 - 2 %    Immature Grans % 0.8 0.0 - 1.0 %    nRBC 0 0 /100(WBC)    Neutrophils Absolute 4.59 2.0 - 8.8 10*3/uL    Lymphocytes Absolute 2.54 0.7 - 5.5 10*3/uL    Monocytes Absolute 0.97 0.0 - 1.7 10*3/uL    Eosinophils Absolute 0.51 0.0 - 0.8 10*3/uL    Basophils Absolute 0.06 0.0 - 0.2 10*3/uL    Immature Grans, Absolute 0.07 0.00 - 0.10 10*3/uL   Creatinine Urine Random (kidney function) GFR component -    Collection Time: 04/15/24  9:37 AM    Specimen: Urine    Specimen type and source: Urine, Urine specimen (specimen)   Result Value Ref Range    Creatinine Urine, Random 79.1 mg/dL   MAGNESIUM    Collection Time: 04/15/24  9:37 AM     Specimen: Fresh Frozen Plasma    Specimen type and source: Plasma, Blood specimen from patient (specimen)   Result Value Ref Range    Magnesium 1.8 1.6 - 2.6 mg/dL   PHOSPHORUS    Collection Time: 04/15/24  9:37 AM    Specimen: Fresh Frozen Plasma    Specimen type and source: Plasma, Blood specimen from patient (specimen)   Result Value Ref Range    Phosphorus 3.0 2.3 - 4.7 mg/dL   Protein, Urine, Random -    Collection Time: 04/15/24  9:37 AM    Specimen: Urine    Specimen type and source: Urine, Urine specimen (specimen)   Result Value Ref Range    Total Protein, Urine 44.0 (H) 1 - 14 mg/dL   TACROLIMUS LEVEL    Collection Time: 04/15/24  9:37 AM    Specimen type and source: Whole Blood, Blood specimen from patient (specimen)   Result Value Ref Range    Tacrolimus <2.0 (L) 5.0 - 15.0 ng/mL   VITAMIN D,25-HYDROXY    Collection Time: 04/15/24  9:37 AM    Specimen type and source: Serum, Blood specimen from patient (specimen)   Result Value Ref Range    25 Hydroxy, Vitamin D 27.4 (L) >30 ng/mL     Assessment & Plan   Diagnoses and all orders for this visit:    1. Seasonal allergies (Primary)  -     loratadine (Claritin) 10 MG tablet; Take 1 tablet by mouth Daily.  Dispense: 90 tablet; Refill: 3    2. Anxiety  -     clonazePAM (KlonoPIN) 1 MG tablet; Take 1 tablet by mouth 2 (Two) Times a Day As Needed for Anxiety.  Dispense: 60 tablet; Refill: 0    3. Current chronic use of systemic steroids  -     DEXA Bone Density Axial; Future    4. Osteopenia of neck of femur, unspecified laterality  -     DEXA Bone Density Axial; Future    Seasonal allergies prescription for loratadine 10 mg sent to pharmacy.  History of anxiety which is stable we will continue the clonazepam. YU run and reviewed.  Risks of the medication include but are not limited to fatigue, somnolence, increased risk of falls, allergic reaction, dependence, and addiction.  Discussed his medications and reviewed all.  Is on chronic steroids for  transplant rejection therapy and history of osteopenia by bone density in past.  Is due for repeat bone density screening and DEXA scan has been ordered.  Noted referral for colonoscopy entered 5 months ago and patient has not heard.  Will try and clarify this to get him his colon cancer screening.  Patient will follow-up in 3 months for reevaluation and annual wellness.    Video visit completed.       I spent 23 minutes caring for Misael on this date of service. This time includes time spent by me in the following activities:preparing for the visit, reviewing tests, obtaining and/or reviewing a separately obtained history, performing a medically appropriate examination and/or evaluation , counseling and educating the patient/family/caregiver, ordering medications, tests, or procedures, documenting information in the medical record, and independently interpreting results and communicating that information with the patient/family/caregiver

## 2024-06-14 ENCOUNTER — TELEPHONE (OUTPATIENT)
Dept: FAMILY MEDICINE CLINIC | Facility: CLINIC | Age: 54
End: 2024-06-14
Payer: MEDICARE

## 2024-06-14 NOTE — TELEPHONE ENCOUNTER
Caller: Misael Luna    Relationship: Self    Best call back number: [phone]     What was the call regarding: PATIENT STATES THAT PHARMACY DID NOT RECEIVE PRESCRIPTION FOR loratadine (Claritin) 10 MG tablet . REQUESTS TO SEND AGAIN OR CONTACT PHARMACY TO CONFIRM      [id number] [place], [place] - [address] AT [id number] [facility] [phone] [facility] [phone] FX

## 2024-06-14 NOTE — TELEPHONE ENCOUNTER
Spoke lynne Brand at the pharmacy and she states they did receive the rx but his insurance does not cover it.  Pt informed and voiced understanding. TM LONNY

## 2024-06-17 ENCOUNTER — TELEPHONE (OUTPATIENT)
Dept: FAMILY MEDICINE CLINIC | Facility: CLINIC | Age: 54
End: 2024-06-17
Payer: MEDICARE

## 2024-06-17 NOTE — TELEPHONE ENCOUNTER
I called and spoke with the patient per Dr Rutherford request and gave him the number to reschedule his colonoscopy. He verbalized understanding and will call back if needed.

## 2024-08-14 ENCOUNTER — TELEPHONE (OUTPATIENT)
Dept: FAMILY MEDICINE CLINIC | Facility: CLINIC | Age: 54
End: 2024-08-14
Payer: MEDICARE

## 2024-08-14 NOTE — TELEPHONE ENCOUNTER
Okay for  and hub to relay     Called patient to let him know that he has an overdue dexa scan. If patient would still like to have this scan done please give patient central scheduling's number

## 2024-08-15 ENCOUNTER — OFFICE VISIT (OUTPATIENT)
Dept: FAMILY MEDICINE CLINIC | Facility: CLINIC | Age: 54
End: 2024-08-15
Payer: MEDICARE

## 2024-08-15 VITALS
DIASTOLIC BLOOD PRESSURE: 74 MMHG | HEART RATE: 72 BPM | SYSTOLIC BLOOD PRESSURE: 126 MMHG | OXYGEN SATURATION: 100 % | TEMPERATURE: 98.4 F | BODY MASS INDEX: 47.13 KG/M2 | WEIGHT: 301 LBS

## 2024-08-15 DIAGNOSIS — I10 BENIGN ESSENTIAL HYPERTENSION: Primary | ICD-10-CM

## 2024-08-15 DIAGNOSIS — F41.9 ANXIETY: ICD-10-CM

## 2024-08-15 DIAGNOSIS — G89.4 CHRONIC PAIN SYNDROME: ICD-10-CM

## 2024-08-15 DIAGNOSIS — M15.9 GENERALIZED OSTEOARTHRITIS OF MULTIPLE SITES: ICD-10-CM

## 2024-08-15 PROCEDURE — 1126F AMNT PAIN NOTED NONE PRSNT: CPT | Performed by: INTERNAL MEDICINE

## 2024-08-15 PROCEDURE — 99214 OFFICE O/P EST MOD 30 MIN: CPT | Performed by: INTERNAL MEDICINE

## 2024-08-15 PROCEDURE — 3074F SYST BP LT 130 MM HG: CPT | Performed by: INTERNAL MEDICINE

## 2024-08-15 PROCEDURE — 3078F DIAST BP <80 MM HG: CPT | Performed by: INTERNAL MEDICINE

## 2024-08-15 RX ORDER — CLONAZEPAM 1 MG/1
1 TABLET ORAL 2 TIMES DAILY PRN
Qty: 60 TABLET | Refills: 2 | Status: SHIPPED | OUTPATIENT
Start: 2024-08-15

## 2024-08-15 NOTE — PROGRESS NOTES
Subjective   Misael Luna is a 54 y.o. male.     Chief Complaint   Patient presents with    Hypertension       History of Present Illness   Wife was present during the history-taking and subsequent discussion (and for part of the physical exam) with this patient.  Patient agrees to the presence of the individual during this visit.      Anxiety doing well and using the clonazepam about 2 times per day and tolerating well with no side effects.  Last filled with #60 tabs on 11/28/23.  Followed by pain management Dr Villarreal and  using oxycodone 20 mg PRN.     Having difficulties with allergies with a clear sinus drainage has been going on for a while and asking for refill/prescription for loratadine to be sent to the pharmacy.  Denies any nosebleeds fevers chills nausea vomiting or sore throat.     History of chronic urinary retention and abdominal pain still requiring the been laying down when he has self catheterizations every 2 hours. Continues to be followed by urology and [facility] renal transplant team. No new changes in medications. Just finished another course of his antibiotics because of urinary tract infections that are recurrent.  No current fever, chills, nausea or vomiting.  Always burns to use the catheter.  Has appointment with [facility] transplant clinic for regular follow up.     History of high cholesterol.  Currently, has been feeling well without any myalgias, muscle aches, weakness, numbness, chest pain, short of breath or other issues.  Currently, is adherent with medication regimen of simvastatin 20 mg daily and denies medication side effects.  Last lipid panel performed on 8/26/2021 and repeat labs ordered 8/13/2022 but not completed.     Patient with chronic pain and has been seeing pain management but are not taking his insurance so patient is wanting to change from [facility] to [facility] where is wife goes..      The following  portions of the patient's history were reviewed and updated as appropriate: allergies, current medications, past family history, past medical history, past social history, past surgical history and problem list.    Depression Screen:      8/15/2024     1:41 PM   PHQ-2/PHQ-9 Depression Screening   Little Interest or Pleasure in Doing Things 0-->not at all   Feeling Down, Depressed or Hopeless 0-->not at all   PHQ-9: Brief Depression Severity Measure Score 0       Past Medical History:   Diagnosis Date    Asthma     Disorder of skin     Generalized osteoarthritis     Hematuria     High risk sexual behavior     History of closed fracture     Lower end of forearm, unspecified laterality     History of kidney transplant     Kidney replaced by transplant    Hypertension     Incomplete emptying of bladder     Obesity     Penile lesion     Tinea cruris     Urinary obstruction     Urinary tract infection        Past Surgical History:   Procedure Laterality Date    BLADDER SURGERY      bladder augmentation    NEPHRECTOMY      TRANSPLANTATION RENAL  1991       Family History   Problem Relation Age of Onset    Arthritis Mother     Asthma Mother     Arthritis Father     Asthma Father     Heart disease Father     COPD Father     Hyperlipidemia Father     Arthritis Maternal Grandmother     Osteoporosis Maternal Grandfather     Arthritis Maternal Grandfather     No Known Problems Other        Social History     Socioeconomic History    Marital status:    Tobacco Use    Smoking status: Never    Smokeless tobacco: Never   Vaping Use    Vaping status: Never Used   Substance and Sexual Activity    Alcohol use: No    Drug use: No    Sexual activity: Defer     Comment: High risk sexual behavior       Current Outpatient Medications   Medication Sig Dispense Refill    albuterol (ACCUNEB) 1.25 MG/3ML nebulizer solution Take 3 mL by nebulization Every 6 (Six) Hours As Needed for Wheezing. 120 each 6    albuterol sulfate  (90  Base) MCG/ACT inhaler Inhale 2 puffs Every 4 (Four) Hours As Needed for Wheezing. 18 g 6    azaTHIOprine (IMURAN) 50 MG tablet 3 tablets.      ciprofloxacin (CIPRO) 500 MG tablet Take 1 tablet by mouth Every 12 (Twelve) Hours.      clonazePAM (KlonoPIN) 1 MG tablet Take 1 tablet by mouth 2 (Two) Times a Day As Needed for Anxiety. 60 tablet 2    furosemide (LASIX) 20 MG tablet       loratadine (Claritin) 10 MG tablet Take 1 tablet by mouth Daily. 90 tablet 3    oxyCODONE (ROXICODONE) 20 MG tablet Take 1 tablet by mouth Every 4 (Four) Hours As Needed for Moderate Pain.      predniSONE (DELTASONE) 10 MG tablet Take 1 tablet by mouth Daily.      simvastatin (ZOCOR) 20 MG tablet Take 1 tablet by mouth Every Night. 90 tablet 3    Spacer/Aero-Holding Chambers device Use as directed with the inhalers 1 each 0    Testosterone 20.25 MG/ACT (1.62%) gel        No current facility-administered medications for this visit.       Review of Systems   Constitutional:  Negative for activity change, appetite change, fatigue, fever, unexpected weight gain and unexpected weight loss.   HENT:  Negative for nosebleeds, rhinorrhea, trouble swallowing and voice change.    Eyes:  Negative for visual disturbance.   Respiratory:  Negative for cough, chest tightness, shortness of breath and wheezing.    Cardiovascular:  Negative for chest pain, palpitations and leg swelling.   Gastrointestinal:  Negative for abdominal pain, blood in stool, constipation, diarrhea, nausea, vomiting, GERD and indigestion.   Genitourinary:  Negative for dysuria, frequency and hematuria.   Musculoskeletal:  Negative for arthralgias, back pain and myalgias.   Skin:  Negative for rash and wound.   Neurological:  Negative for dizziness, tremors, weakness, light-headedness, numbness, headache and memory problem.   Hematological:  Negative for adenopathy. Does not bruise/bleed easily.   Psychiatric/Behavioral:  Negative for sleep disturbance and depressed mood. The  patient is not nervous/anxious.        Objective   /74 (BP Location: Left arm, Patient Position: Sitting, Cuff Size: Large Adult)   Pulse 72   Temp 98.4 °F (36.9 °C) (Temporal)   Wt (!) 137 kg (301 lb)   SpO2 100%   BMI 47.13 kg/m²     Physical Exam  Vitals and nursing note reviewed.   Constitutional:       General: He is not in acute distress.     Appearance: He is well-developed. He is obese. He is not diaphoretic.   HENT:      Head: Normocephalic and atraumatic.      Right Ear: External ear normal.      Left Ear: External ear normal.      Nose: Nose normal.   Eyes:      Conjunctiva/sclera: Conjunctivae normal.      Pupils: Pupils are equal, round, and reactive to light.   Neck:      Thyroid: No thyromegaly.      Trachea: No tracheal deviation.   Cardiovascular:      Rate and Rhythm: Normal rate and regular rhythm.      Heart sounds: Normal heart sounds. No murmur heard.     No friction rub. No gallop.   Pulmonary:      Effort: Pulmonary effort is normal. No respiratory distress.      Breath sounds: Normal breath sounds.   Abdominal:      General: Bowel sounds are normal.      Palpations: Abdomen is soft. There is no mass.      Tenderness: There is no abdominal tenderness. There is no guarding.   Musculoskeletal:         General: Normal range of motion.      Cervical back: Normal range of motion and neck supple.   Lymphadenopathy:      Cervical: No cervical adenopathy.   Skin:     General: Skin is warm and dry.      Capillary Refill: Capillary refill takes less than 2 seconds.      Findings: No rash.   Neurological:      Mental Status: He is alert and oriented to person, place, and time.      Motor: No abnormal muscle tone.      Deep Tendon Reflexes: Reflexes normal.   Psychiatric:         Behavior: Behavior normal.         Thought Content: Thought content normal.         Judgment: Judgment normal.         No results found for this or any previous visit (from the past 2016 hour(s)).  Assessment & Plan    Diagnoses and all orders for this visit:    1. Benign essential hypertension (Primary)    2. Anxiety  -     clonazePAM (KlonoPIN) 1 MG tablet; Take 1 tablet by mouth 2 (Two) Times a Day As Needed for Anxiety.  Dispense: 60 tablet; Refill: 2    3. Chronic pain syndrome  -     Ambulatory Referral to Pain Management Clinic    4. Generalized osteoarthritis of multiple sites  -     Ambulatory Referral to Pain Management Clinic    Hypertension well-controlled.  We discussed his anxiety issues and is stable utilizing clonazepam as needed.  We discussed his chronic pain syndrome with multiple sites of arthritis especially in his hips.  Will once again try and get him in with a new pain management as per his request.  We did discuss his pains and likely improvement with some weight loss which she is understanding but is having difficulties.           Dictated utilizing Dragon Dictation

## 2024-11-18 ENCOUNTER — TELEPHONE (OUTPATIENT)
Dept: FAMILY MEDICINE CLINIC | Facility: CLINIC | Age: 54
End: 2024-11-18
Payer: MEDICARE

## 2024-11-18 NOTE — TELEPHONE ENCOUNTER
The patient's wife called in and said that the patient is needing a refill on his clonazePAM 1 MG.

## 2024-11-19 DIAGNOSIS — F41.9 ANXIETY: ICD-10-CM

## 2024-11-19 RX ORDER — CLONAZEPAM 1 MG/1
1 TABLET ORAL 2 TIMES DAILY PRN
Qty: 60 TABLET | Refills: 0 | Status: SHIPPED | OUTPATIENT
Start: 2024-11-19

## 2025-01-09 ENCOUNTER — OFFICE VISIT (OUTPATIENT)
Dept: FAMILY MEDICINE CLINIC | Facility: CLINIC | Age: 55
End: 2025-01-09
Payer: MEDICARE

## 2025-01-09 VITALS
HEART RATE: 67 BPM | WEIGHT: 302.6 LBS | SYSTOLIC BLOOD PRESSURE: 138 MMHG | OXYGEN SATURATION: 97 % | DIASTOLIC BLOOD PRESSURE: 82 MMHG | BODY MASS INDEX: 47.49 KG/M2 | TEMPERATURE: 97.3 F | HEIGHT: 67 IN

## 2025-01-09 DIAGNOSIS — R30.9 PAIN WITH URINATION: Primary | ICD-10-CM

## 2025-01-09 DIAGNOSIS — R13.19 ESOPHAGEAL DYSPHAGIA: ICD-10-CM

## 2025-01-09 DIAGNOSIS — F41.9 ANXIETY: ICD-10-CM

## 2025-01-09 DIAGNOSIS — Z23 IMMUNIZATION DUE: ICD-10-CM

## 2025-01-09 DIAGNOSIS — N30.01 ACUTE CYSTITIS WITH HEMATURIA: ICD-10-CM

## 2025-01-09 DIAGNOSIS — Z12.11 SCREENING FOR COLON CANCER: ICD-10-CM

## 2025-01-09 LAB
BILIRUB BLD-MCNC: NEGATIVE MG/DL
CLARITY, POC: CLEAR
COLOR UR: YELLOW
EXPIRATION DATE: ABNORMAL
GLUCOSE UR STRIP-MCNC: NEGATIVE MG/DL
KETONES UR QL: NEGATIVE
LEUKOCYTE EST, POC: ABNORMAL
Lab: ABNORMAL
NITRITE UR-MCNC: NEGATIVE MG/ML
PH UR: 6 [PH] (ref 5–8)
PROT UR STRIP-MCNC: ABNORMAL MG/DL
RBC # UR STRIP: ABNORMAL /UL
SP GR UR: 1.01 (ref 1–1.03)
UROBILINOGEN UR QL: NORMAL

## 2025-01-09 RX ORDER — CLONAZEPAM 1 MG/1
1 TABLET ORAL 2 TIMES DAILY PRN
Qty: 60 TABLET | Refills: 1 | Status: SHIPPED | OUTPATIENT
Start: 2025-01-09

## 2025-01-09 NOTE — PROGRESS NOTES
Subjective   Misael Luna is a 54 y.o. male.     Chief Complaint   Patient presents with    Urinary Tract Infection     Uti        History of Present Illness   Wife was present during the history-taking and subsequent discussion (and for part of the physical exam) with this patient.  Patient agrees to the presence of the individual during this visit.     Having pain with self cath and burning all the time in the urethral area and having cloudy urine.  History of UTIs frequently.  Last treated over a year ago.    Having difficulty with esophageal spasms and has episodes of food sticking at time especially with meats.  Episodes can last 3-4 hours.     Anxiety doing well and using the clonazepam about 2 times per day and tolerating well with no side effects.  Last filled with #60 tabs on 11/28/23.  Followed by pain management Dr Villarreal and using oxycodone 20 mg PRN.     History of chronic urinary retention and abdominal pain still requiring the been laying down when he has self catheterizations every 2 hours. Continues to be followed by urology and [facility] renal transplant team. No new changes in medications. Just finished another course of his antibiotics because of urinary tract infections that are recurrent.  No current fever, chills, nausea or vomiting.  Always burns to use the catheter.  Has appointment with [facility] transplant clinic for regular follow up.     History of high cholesterol.  Currently, has been feeling well without any myalgias, muscle aches, weakness, numbness, chest pain, short of breath or other issues.  Currently, is adherent with medication regimen of simvastatin 20 mg daily and denies medication side effects.  Last lipid panel performed on 4/15/2024 with an LDL of 97 triglycerides 262 and HDL of 31.  Total cholesterol 180.     Patient with chronic pain and has been seeing pain management but are not taking his insurance so patient is wanting to [facility] to [facility] where is wife goes.    The following portions of the patient's history were reviewed and updated as appropriate: allergies, current medications, past family history, past medical history, past social history, past surgical history and problem list.    Depression Screen:      8/15/2024     1:41 PM   PHQ-2/PHQ-9 Depression Screening   Retired Little Interest or Pleasure in Doing Things 0-->not at all   Retired Feeling Down, Depressed or Hopeless 0-->not at all   Retired PHQ-9: Brief Depression Severity Measure Score 0       Past Medical History:   Diagnosis Date    Asthma     Disorder of skin     Generalized osteoarthritis     Hematuria     High risk sexual behavior     History of closed fracture     Lower end of forearm, unspecified laterality     History of kidney transplant     Kidney replaced by transplant    Hypertension     Incomplete emptying of bladder     Obesity     Penile lesion     Tinea cruris     Urinary obstruction     Urinary tract infection        Past Surgical History:   Procedure Laterality Date    BLADDER SURGERY      bladder augmentation    NEPHRECTOMY      TRANSPLANTATION RENAL  1991       Family History   Problem Relation Age of Onset    Arthritis Mother     Asthma Mother     Arthritis Father     Asthma Father     Heart disease Father     COPD Father     Hyperlipidemia Father     Arthritis Maternal Grandmother     Osteoporosis Maternal Grandfather     Arthritis Maternal Grandfather     No Known Problems Other        Social History     Socioeconomic History    Marital status:    Tobacco Use    Smoking status: Never    Smokeless tobacco: Never   Vaping Use    Vaping status: Never Used   Substance and Sexual Activity    Alcohol use: No    Drug use: No    Sexual activity: Defer     Comment: High risk sexual behavior       Current Outpatient Medications   Medication Sig Dispense Refill    albuterol (ACCUNEB) 1.25 MG/3ML nebulizer solution  Take 3 mL by nebulization Every 6 (Six) Hours As Needed for Wheezing. 120 each 6    albuterol sulfate  (90 Base) MCG/ACT inhaler Inhale 2 puffs Every 4 (Four) Hours As Needed for Wheezing. 18 g 6    azaTHIOprine (IMURAN) 50 MG tablet 3 tablets.      ciprofloxacin (CIPRO) 500 MG tablet Take 1 tablet by mouth Every 12 (Twelve) Hours.      clonazePAM (KlonoPIN) 1 MG tablet Take 1 tablet by mouth 2 (Two) Times a Day As Needed for Anxiety. 60 tablet 1    furosemide (LASIX) 20 MG tablet       loratadine (Claritin) 10 MG tablet Take 1 tablet by mouth Daily. 90 tablet 3    oxyCODONE (ROXICODONE) 20 MG tablet Take 1 tablet by mouth Every 4 (Four) Hours As Needed for Moderate Pain.      simvastatin (ZOCOR) 20 MG tablet Take 1 tablet by mouth Every Night. 90 tablet 3    Spacer/Aero-Holding Chambers device Use as directed with the inhalers 1 each 0    Testosterone 20.25 MG/ACT (1.62%) gel       amoxicillin-clavulanate (AUGMENTIN) 875-125 MG per tablet Take 1 tablet by mouth 2 (Two) Times a Day. 20 tablet 0     No current facility-administered medications for this visit.       Review of Systems   Constitutional:  Negative for activity change, appetite change, fatigue, fever, unexpected weight gain and unexpected weight loss.   HENT:  Negative for nosebleeds, rhinorrhea, trouble swallowing and voice change.    Eyes:  Negative for visual disturbance.   Respiratory:  Negative for cough, chest tightness, shortness of breath and wheezing.    Cardiovascular:  Negative for chest pain, palpitations and leg swelling.   Gastrointestinal:  Negative for abdominal pain, blood in stool, constipation, diarrhea, nausea, vomiting, GERD and indigestion.   Genitourinary:  Positive for hematuria and penile pain. Negative for dysuria and flank pain.   Musculoskeletal:  Negative for arthralgias, back pain and myalgias.   Skin:  Negative for rash and wound.   Neurological:  Negative for dizziness, tremors, weakness, light-headedness, numbness,  "headache and memory problem.   Hematological:  Negative for adenopathy. Does not bruise/bleed easily.   Psychiatric/Behavioral:  Negative for sleep disturbance and depressed mood. The patient is not nervous/anxious.        Objective   /82 (BP Location: Left arm, Patient Position: Sitting, Cuff Size: Adult)   Pulse 67   Temp 97.3 °F (36.3 °C)   Ht 170.2 cm (67.01\")   Wt (!) 137 kg (302 lb 9.6 oz)   SpO2 97%   BMI 47.38 kg/m²     Physical Exam  Vitals and nursing note reviewed.   Constitutional:       General: He is not in acute distress.     Appearance: He is well-developed. He is obese. He is not diaphoretic.   HENT:      Head: Normocephalic and atraumatic.      Right Ear: External ear normal.      Left Ear: External ear normal.      Nose: Nose normal.   Eyes:      Conjunctiva/sclera: Conjunctivae normal.      Pupils: Pupils are equal, round, and reactive to light.   Neck:      Thyroid: No thyromegaly.      Trachea: No tracheal deviation.   Cardiovascular:      Rate and Rhythm: Normal rate and regular rhythm.      Heart sounds: Normal heart sounds. No murmur heard.     No friction rub. No gallop.   Pulmonary:      Effort: Pulmonary effort is normal. No respiratory distress.      Breath sounds: Normal breath sounds.   Abdominal:      General: Bowel sounds are normal.      Palpations: Abdomen is soft. There is no mass.      Tenderness: There is no abdominal tenderness. There is no guarding.   Musculoskeletal:         General: Normal range of motion.      Cervical back: Normal range of motion and neck supple.   Lymphadenopathy:      Cervical: No cervical adenopathy.   Skin:     General: Skin is warm and dry.      Capillary Refill: Capillary refill takes less than 2 seconds.      Findings: No rash.   Neurological:      Mental Status: He is alert and oriented to person, place, and time.      Motor: No abnormal muscle tone.      Deep Tendon Reflexes: Reflexes normal.   Psychiatric:         Behavior: Behavior " normal.         Thought Content: Thought content normal.         Judgment: Judgment normal.         Recent Results (from the past 12 weeks)   POCT urinalysis dipstick, automated    Collection Time: 01/09/25  4:18 PM    Specimen: Urine   Result Value Ref Range    Color Yellow Yellow, Straw, Dark Yellow, Mackenzie    Clarity, UA Clear Clear    Specific Gravity  1.015 1.005 - 1.030    pH, Urine 6.0 5.0 - 8.0    Leukocytes Trace (A) Negative    Nitrite, UA Negative Negative    Protein, POC 2+ (A) Negative mg/dL    Glucose, UA Negative Negative mg/dL    Ketones, UA Negative Negative    Urobilinogen, UA Normal Normal, 0.2 E.U./dL    Bilirubin Negative Negative    Blood, UA Moderate (A) Negative    Lot Number [id number]     Expiration Date 1/14/25      Assessment & Plan   Diagnoses and all orders for this visit:    1. Pain with urination (Primary)  -     POCT urinalysis dipstick, automated  -     Urine Culture - Urine, Urine, Clean Catch    2. Acute cystitis with hematuria  -     amoxicillin-clavulanate (AUGMENTIN) 875-125 MG per tablet; Take 1 tablet by mouth 2 (Two) Times a Day.  Dispense: 20 tablet; Refill: 0    3. Esophageal dysphagia  -     Ambulatory Referral to Gastroenterology    4. Immunization due  -     Fluzone >6mos ([id number])    5. Anxiety  -     clonazePAM (KlonoPIN) 1 MG tablet; Take 1 tablet by mouth 2 (Two) Times a Day As Needed for Anxiety.  Dispense: 60 tablet; Refill: 1    6. Screening for colon cancer  -     Cologuard - Stool, Per Rectum; Future      Recurrent UTI.  Most recent antibiotic was Cipro therefore will change to Augmentin twice a day while awaiting the urine culture be sure we have sensitivities appropriate.  He is having esophageal issues with swallowing and dysphagia.  Possible stricture versus dysmotility.  Will refer for GI for EGD evaluation.  Anxiety was discussed.  Discussed the clonazepam.  YU run and reviewed.  Risks of the medication include but are not limited to fatigue,  somnolence, increased risk of falls, allergic reaction, dependence, and addiction.  Discussed colon cancer screening which she is due for and has been ordered for Cologuard.  If positive then may be able to have EGD and colonoscopy in the future.         Dictated utilizing Dragon Dictation

## 2025-01-11 LAB
BACTERIA UR CULT: NORMAL
BACTERIA UR CULT: NORMAL

## 2025-01-23 DIAGNOSIS — E78.5 HYPERLIPIDEMIA, UNSPECIFIED HYPERLIPIDEMIA TYPE: ICD-10-CM

## 2025-01-23 RX ORDER — SIMVASTATIN 20 MG
20 TABLET ORAL NIGHTLY
Qty: 90 TABLET | Refills: 3 | Status: SHIPPED | OUTPATIENT
Start: 2025-01-23

## 2025-03-14 DIAGNOSIS — F41.9 ANXIETY: ICD-10-CM

## 2025-03-14 NOTE — TELEPHONE ENCOUNTER
Caller: Misael Luna    Relationship: Self    Best call back number: [phone]     Requested Prescriptions:   Requested Prescriptions     Pending Prescriptions Disp Refills    clonazePAM (KlonoPIN) 1 MG tablet 60 tablet 1     Sig: Take 1 tablet by mouth 2 (Two) Times a Day As Needed for Anxiety.        Pharmacy where request should be sent: [facility]ARMACY #[id number] [address] AT [facility] [phone] [facility] [phone] FX     Last office visit with prescribing clinician: 1/9/2025   Last telemedicine visit with prescribing clinician: Visit date not found   Next office visit with prescribing clinician: 4/10/2025     Additional details provided by patient: PATIENT HAS 2 DAYS LEFT OF THIS MEDICATION.     PRESCRIPTION NEEDS TO GO TO [facility] ABOVE AND NOT THE [facility] THAT WAS PREVIOUSLY USED    Does the patient have less than a 3 day supply:  [x] Yes  [] No    Miriam Mcqueen Rep   03/14/25 14:13 EDT

## 2025-03-18 RX ORDER — CLONAZEPAM 1 MG/1
1 TABLET ORAL 2 TIMES DAILY PRN
Qty: 60 TABLET | Refills: 1 | Status: SHIPPED | OUTPATIENT
Start: 2025-03-18